# Patient Record
Sex: MALE | Race: WHITE | NOT HISPANIC OR LATINO | Employment: FULL TIME | URBAN - METROPOLITAN AREA
[De-identification: names, ages, dates, MRNs, and addresses within clinical notes are randomized per-mention and may not be internally consistent; named-entity substitution may affect disease eponyms.]

---

## 2018-04-02 ENCOUNTER — OFFICE VISIT (OUTPATIENT)
Dept: URGENT CARE | Facility: PHYSICIAN GROUP | Age: 33
End: 2018-04-02
Payer: COMMERCIAL

## 2018-04-02 ENCOUNTER — HOSPITAL ENCOUNTER (OUTPATIENT)
Dept: RADIOLOGY | Facility: MEDICAL CENTER | Age: 33
End: 2018-04-02
Attending: INTERNAL MEDICINE
Payer: COMMERCIAL

## 2018-04-02 VITALS
DIASTOLIC BLOOD PRESSURE: 76 MMHG | RESPIRATION RATE: 16 BRPM | HEART RATE: 97 BPM | BODY MASS INDEX: 34.33 KG/M2 | WEIGHT: 259 LBS | HEIGHT: 73 IN | TEMPERATURE: 98 F | SYSTOLIC BLOOD PRESSURE: 128 MMHG | OXYGEN SATURATION: 94 %

## 2018-04-02 DIAGNOSIS — N23 RENAL COLIC ON LEFT SIDE: ICD-10-CM

## 2018-04-02 DIAGNOSIS — N20.0 KIDNEY STONE: ICD-10-CM

## 2018-04-02 LAB
APPEARANCE UR: NORMAL
BILIRUB UR STRIP-MCNC: NEGATIVE MG/DL
COLOR UR AUTO: NORMAL
GLUCOSE UR STRIP.AUTO-MCNC: NEGATIVE MG/DL
KETONES UR STRIP.AUTO-MCNC: NEGATIVE MG/DL
LEUKOCYTE ESTERASE UR QL STRIP.AUTO: NEGATIVE
NITRITE UR QL STRIP.AUTO: NEGATIVE
PH UR STRIP.AUTO: 5 [PH] (ref 5–8)
PROT UR QL STRIP: NORMAL MG/DL
RBC UR QL AUTO: NORMAL
SP GR UR STRIP.AUTO: 1.02
UROBILINOGEN UR STRIP-MCNC: NEGATIVE MG/DL

## 2018-04-02 PROCEDURE — 99204 OFFICE O/P NEW MOD 45 MIN: CPT | Performed by: INTERNAL MEDICINE

## 2018-04-02 PROCEDURE — 81002 URINALYSIS NONAUTO W/O SCOPE: CPT | Performed by: INTERNAL MEDICINE

## 2018-04-02 PROCEDURE — 74176 CT ABD & PELVIS W/O CONTRAST: CPT

## 2018-04-02 RX ORDER — TAMSULOSIN HYDROCHLORIDE 0.4 MG/1
0.4 CAPSULE ORAL DAILY
Qty: 10 CAP | Refills: 0 | Status: SHIPPED | OUTPATIENT
Start: 2018-04-02 | End: 2018-08-18

## 2018-04-02 RX ORDER — NAPROXEN 500 MG/1
500 TABLET ORAL 2 TIMES DAILY WITH MEALS
Qty: 60 TAB | Refills: 0 | Status: SHIPPED | OUTPATIENT
Start: 2018-04-02 | End: 2018-08-18

## 2018-04-02 ASSESSMENT — ENCOUNTER SYMPTOMS
FEVER: 0
DIZZINESS: 0
PSYCHIATRIC NEGATIVE: 1
NAUSEA: 0
VOMITING: 0
SHORTNESS OF BREATH: 0
WEIGHT LOSS: 0
CHILLS: 0
EYES NEGATIVE: 1
FLANK PAIN: 1
COUGH: 0
PALPITATIONS: 0
SORE THROAT: 0
HEADACHES: 0
DIARRHEA: 0
BLOOD IN STOOL: 0
MYALGIAS: 0
CONSTITUTIONAL NEGATIVE: 1

## 2018-04-02 ASSESSMENT — PAIN SCALES - GENERAL: PAINLEVEL: 5=MODERATE PAIN

## 2018-04-03 NOTE — PROGRESS NOTES
Erik Sandoval is a 32 y.o. male who presents for Groin Pain (Blood in urine, headaches, lower back pain, abdominal pain x2weeks )       Patient is a 32-year-old male presents today with left-sided flank pain. Patient has had kidney stones in the past. Patient states that his pain is been intermittent over the last week and a half. Patient is now stated that the pain is become more consistent over the last several days. Patient denies any fever or shaking chills. Patient does note that he had blood in his urine. Patient denies any nausea vomiting or diarrhea.      PMH:  has a past medical history of Kidney stone.  MEDS:   Current Outpatient Prescriptions:   •  tamsulosin (FLOMAX) 0.4 MG capsule, Take 1 Cap by mouth every day., Disp: 10 Cap, Rfl: 0  •  naproxen (NAPROSYN) 500 MG Tab, Take 1 Tab by mouth 2 times a day, with meals., Disp: 60 Tab, Rfl: 0  •  azithromycin (ZITHROMAX) 250 MG Tab, 2 TABS ON DAY 1, 1 TAB ON DAYS 2-5., Disp: 6 Tab, Rfl: 0  ALLERGIES: No Known Allergies  SURGHX: History reviewed. No pertinent surgical history.  SOCHX:  reports that he has never smoked. He has never used smokeless tobacco. He reports that he does not drink alcohol or use drugs.  FH: family history is not on file.    Review of Systems   Constitutional: Negative.  Negative for chills, fever and weight loss.   HENT: Negative for sore throat.    Eyes: Negative.    Respiratory: Negative for cough and shortness of breath.    Cardiovascular: Negative for chest pain, palpitations and leg swelling.   Gastrointestinal: Negative for blood in stool, diarrhea, nausea and vomiting.   Genitourinary: Positive for flank pain and hematuria. Negative for dysuria, frequency and urgency.   Musculoskeletal: Negative for myalgias.   Skin: Negative for rash.   Neurological: Negative for dizziness (negative headache) and headaches.   Psychiatric/Behavioral: Negative.    All other systems reviewed and are negative.    No Known Allergies   Objective:  "  /76   Pulse 97   Temp 36.7 °C (98 °F)   Resp 16   Ht 1.854 m (6' 1\")   Wt 117.5 kg (259 lb)   SpO2 94%   BMI 34.17 kg/m²   Physical Exam   Constitutional: He is oriented to person, place, and time. He appears well-developed and well-nourished. He is active. No distress.   HENT:   Head: Normocephalic and atraumatic.   Right Ear: External ear normal.   Left Ear: External ear normal.   Mouth/Throat: Oropharynx is clear and moist. No oropharyngeal exudate.   Eyes: Conjunctivae and EOM are normal. Pupils are equal, round, and reactive to light. Right eye exhibits no discharge. Left eye exhibits no discharge. No scleral icterus.   Neck: Normal range of motion. Neck supple. No JVD present. Carotid bruit is not present. No thyroid mass and no thyromegaly present.   Cardiovascular: Normal rate, regular rhythm, S1 normal, S2 normal and normal heart sounds.  Exam reveals no friction rub.    No murmur heard.  Pulmonary/Chest: Effort normal and breath sounds normal. No respiratory distress. He has no wheezes. He has no rales.   Abdominal: Soft. Bowel sounds are normal. He exhibits no distension and no mass. There is no hepatosplenomegaly. There is tenderness in the left lower quadrant. There is no rebound and no guarding.       Musculoskeletal: Normal range of motion. He exhibits no edema.        Cervical back: Normal.   Lymphadenopathy:        Head (right side): No submental, no submandibular and no occipital adenopathy present.        Head (left side): No submental, no submandibular and no occipital adenopathy present.     He has no cervical adenopathy.   Neurological: He is alert and oriented to person, place, and time. He has normal strength. No cranial nerve deficit.   Skin: Skin is warm and dry. No rash noted. No erythema.   Psychiatric: He has a normal mood and affect. His behavior is normal. Thought content normal.   CT scan shows 6 mm stone in the distal left ureter, mild hydro-nephrosis    "   Assessment/Plan:   Assessment    1. Renal colic on left side  - CT-RENAL COLIC EVALUATION(A/P W/O); Future    2. Kidney stone  - tamsulosin (FLOMAX) 0.4 MG capsule; Take 1 Cap by mouth every day.  Dispense: 10 Cap; Refill: 0  - naproxen (NAPROSYN) 500 MG Tab; Take 1 Tab by mouth 2 times a day, with meals.  Dispense: 60 Tab; Refill: 0  Differential diagnosis, natural history, supportive care, and indications for immediate follow-up discussed.    Discussed with the patient to drink a significant amount of fluids. Patient reports to the emergency department for worsening pain/fever etc. patient follow up with PCP

## 2018-08-18 ENCOUNTER — APPOINTMENT (OUTPATIENT)
Dept: RADIOLOGY | Facility: MEDICAL CENTER | Age: 33
DRG: 872 | End: 2018-08-18
Attending: EMERGENCY MEDICINE
Payer: COMMERCIAL

## 2018-08-18 ENCOUNTER — APPOINTMENT (OUTPATIENT)
Dept: RADIOLOGY | Facility: MEDICAL CENTER | Age: 33
DRG: 872 | End: 2018-08-18
Payer: COMMERCIAL

## 2018-08-18 ENCOUNTER — HOSPITAL ENCOUNTER (INPATIENT)
Facility: MEDICAL CENTER | Age: 33
LOS: 2 days | DRG: 872 | End: 2018-08-20
Attending: EMERGENCY MEDICINE | Admitting: HOSPITALIST
Payer: COMMERCIAL

## 2018-08-18 DIAGNOSIS — N20.0 KIDNEY STONE: ICD-10-CM

## 2018-08-18 PROBLEM — E87.6 HYPOKALEMIA: Status: ACTIVE | Noted: 2018-08-18

## 2018-08-18 PROBLEM — E66.9 OBESITY: Status: ACTIVE | Noted: 2018-08-18

## 2018-08-18 PROBLEM — R07.9 CHEST PAIN: Status: ACTIVE | Noted: 2018-08-18

## 2018-08-18 PROBLEM — N39.0 SEPSIS DUE TO URINARY TRACT INFECTION (HCC): Status: ACTIVE | Noted: 2018-08-18

## 2018-08-18 PROBLEM — A41.9 SEPSIS DUE TO URINARY TRACT INFECTION (HCC): Status: ACTIVE | Noted: 2018-08-18

## 2018-08-18 LAB
ALBUMIN SERPL BCP-MCNC: 4.1 G/DL (ref 3.2–4.9)
ALBUMIN/GLOB SERPL: 1.2 G/DL
ALP SERPL-CCNC: 41 U/L (ref 30–99)
ALT SERPL-CCNC: 34 U/L (ref 2–50)
ANION GAP SERPL CALC-SCNC: 11 MMOL/L (ref 0–11.9)
APPEARANCE UR: CLEAR
AST SERPL-CCNC: 22 U/L (ref 12–45)
BACTERIA #/AREA URNS HPF: NEGATIVE /HPF
BASOPHILS # BLD AUTO: 0.2 % (ref 0–1.8)
BASOPHILS # BLD: 0.02 K/UL (ref 0–0.12)
BILIRUB SERPL-MCNC: 1.2 MG/DL (ref 0.1–1.5)
BILIRUB UR QL STRIP.AUTO: ABNORMAL
BUN SERPL-MCNC: 13 MG/DL (ref 8–22)
CALCIUM SERPL-MCNC: 8.7 MG/DL (ref 8.5–10.5)
CHLORIDE SERPL-SCNC: 103 MMOL/L (ref 96–112)
CO2 SERPL-SCNC: 21 MMOL/L (ref 20–33)
COLOR UR: ABNORMAL
CREAT SERPL-MCNC: 0.94 MG/DL (ref 0.5–1.4)
EKG IMPRESSION: NORMAL
EOSINOPHIL # BLD AUTO: 0 K/UL (ref 0–0.51)
EOSINOPHIL NFR BLD: 0 % (ref 0–6.9)
EPI CELLS #/AREA URNS HPF: ABNORMAL /HPF
ERYTHROCYTE [DISTWIDTH] IN BLOOD BY AUTOMATED COUNT: 40.5 FL (ref 35.9–50)
GLOBULIN SER CALC-MCNC: 3.3 G/DL (ref 1.9–3.5)
GLUCOSE SERPL-MCNC: 115 MG/DL (ref 65–99)
GLUCOSE UR STRIP.AUTO-MCNC: NEGATIVE MG/DL
HCT VFR BLD AUTO: 45.6 % (ref 42–52)
HGB BLD-MCNC: 15.4 G/DL (ref 14–18)
HYALINE CASTS #/AREA URNS LPF: ABNORMAL /LPF
IMM GRANULOCYTES # BLD AUTO: 0.05 K/UL (ref 0–0.11)
IMM GRANULOCYTES NFR BLD AUTO: 0.4 % (ref 0–0.9)
KETONES UR STRIP.AUTO-MCNC: NEGATIVE MG/DL
LACTATE BLD-SCNC: 1.1 MMOL/L (ref 0.5–2)
LACTATE BLD-SCNC: 1.3 MMOL/L (ref 0.5–2)
LEUKOCYTE ESTERASE UR QL STRIP.AUTO: ABNORMAL
LYMPHOCYTES # BLD AUTO: 1.14 K/UL (ref 1–4.8)
LYMPHOCYTES NFR BLD: 10.1 % (ref 22–41)
MCH RBC QN AUTO: 28.7 PG (ref 27–33)
MCHC RBC AUTO-ENTMCNC: 33.8 G/DL (ref 33.7–35.3)
MCV RBC AUTO: 85.1 FL (ref 81.4–97.8)
MICRO URNS: ABNORMAL
MONOCYTES # BLD AUTO: 0.6 K/UL (ref 0–0.85)
MONOCYTES NFR BLD AUTO: 5.3 % (ref 0–13.4)
NEUTROPHILS # BLD AUTO: 9.45 K/UL (ref 1.82–7.42)
NEUTROPHILS NFR BLD: 84 % (ref 44–72)
NITRITE UR QL STRIP.AUTO: NEGATIVE
NRBC # BLD AUTO: 0 K/UL
NRBC BLD-RTO: 0 /100 WBC
PH UR STRIP.AUTO: 6 [PH]
PLATELET # BLD AUTO: 176 K/UL (ref 164–446)
PMV BLD AUTO: 9.3 FL (ref 9–12.9)
POTASSIUM SERPL-SCNC: 3.1 MMOL/L (ref 3.6–5.5)
PROT SERPL-MCNC: 7.4 G/DL (ref 6–8.2)
PROT UR QL STRIP: 30 MG/DL
RBC # BLD AUTO: 5.36 M/UL (ref 4.7–6.1)
RBC # URNS HPF: ABNORMAL /HPF
RBC UR QL AUTO: ABNORMAL
SODIUM SERPL-SCNC: 135 MMOL/L (ref 135–145)
SP GR UR STRIP.AUTO: 1.03
TSH SERPL DL<=0.005 MIU/L-ACNC: 1.27 UIU/ML (ref 0.38–5.33)
UROBILINOGEN UR STRIP.AUTO-MCNC: 2 MG/DL
WBC # BLD AUTO: 11.3 K/UL (ref 4.8–10.8)
WBC #/AREA URNS HPF: ABNORMAL /HPF

## 2018-08-18 PROCEDURE — 87086 URINE CULTURE/COLONY COUNT: CPT

## 2018-08-18 PROCEDURE — 81001 URINALYSIS AUTO W/SCOPE: CPT

## 2018-08-18 PROCEDURE — 93005 ELECTROCARDIOGRAM TRACING: CPT | Performed by: EMERGENCY MEDICINE

## 2018-08-18 PROCEDURE — 93005 ELECTROCARDIOGRAM TRACING: CPT

## 2018-08-18 PROCEDURE — 96365 THER/PROPH/DIAG IV INF INIT: CPT

## 2018-08-18 PROCEDURE — 85025 COMPLETE CBC W/AUTO DIFF WBC: CPT

## 2018-08-18 PROCEDURE — 83605 ASSAY OF LACTIC ACID: CPT

## 2018-08-18 PROCEDURE — 700102 HCHG RX REV CODE 250 W/ 637 OVERRIDE(OP): Performed by: HOSPITALIST

## 2018-08-18 PROCEDURE — 87040 BLOOD CULTURE FOR BACTERIA: CPT | Mod: 91

## 2018-08-18 PROCEDURE — 700102 HCHG RX REV CODE 250 W/ 637 OVERRIDE(OP): Performed by: EMERGENCY MEDICINE

## 2018-08-18 PROCEDURE — 700105 HCHG RX REV CODE 258: Performed by: EMERGENCY MEDICINE

## 2018-08-18 PROCEDURE — 80053 COMPREHEN METABOLIC PANEL: CPT

## 2018-08-18 PROCEDURE — 74176 CT ABD & PELVIS W/O CONTRAST: CPT

## 2018-08-18 PROCEDURE — 83036 HEMOGLOBIN GLYCOSYLATED A1C: CPT

## 2018-08-18 PROCEDURE — 96361 HYDRATE IV INFUSION ADD-ON: CPT

## 2018-08-18 PROCEDURE — A9270 NON-COVERED ITEM OR SERVICE: HCPCS | Performed by: HOSPITALIST

## 2018-08-18 PROCEDURE — 700111 HCHG RX REV CODE 636 W/ 250 OVERRIDE (IP): Performed by: EMERGENCY MEDICINE

## 2018-08-18 PROCEDURE — 770006 HCHG ROOM/CARE - MED/SURG/GYN SEMI*

## 2018-08-18 PROCEDURE — 71045 X-RAY EXAM CHEST 1 VIEW: CPT

## 2018-08-18 PROCEDURE — 96375 TX/PRO/DX INJ NEW DRUG ADDON: CPT

## 2018-08-18 PROCEDURE — 84443 ASSAY THYROID STIM HORMONE: CPT

## 2018-08-18 PROCEDURE — 36415 COLL VENOUS BLD VENIPUNCTURE: CPT

## 2018-08-18 PROCEDURE — 700111 HCHG RX REV CODE 636 W/ 250 OVERRIDE (IP)

## 2018-08-18 PROCEDURE — 99223 1ST HOSP IP/OBS HIGH 75: CPT | Performed by: HOSPITALIST

## 2018-08-18 PROCEDURE — A9270 NON-COVERED ITEM OR SERVICE: HCPCS | Performed by: EMERGENCY MEDICINE

## 2018-08-18 PROCEDURE — 99291 CRITICAL CARE FIRST HOUR: CPT

## 2018-08-18 PROCEDURE — 304561 HCHG STAT O2

## 2018-08-18 DEVICE — STENT UROLOGICAL POLARIS 6X26  ULTRA: Type: IMPLANTABLE DEVICE | Site: URETER | Status: FUNCTIONAL

## 2018-08-18 RX ORDER — PROMETHAZINE HYDROCHLORIDE 25 MG/1
12.5-25 SUPPOSITORY RECTAL EVERY 4 HOURS PRN
Status: DISCONTINUED | OUTPATIENT
Start: 2018-08-18 | End: 2018-08-20 | Stop reason: HOSPADM

## 2018-08-18 RX ORDER — SODIUM CHLORIDE 9 MG/ML
INJECTION, SOLUTION INTRAVENOUS CONTINUOUS
Status: DISCONTINUED | OUTPATIENT
Start: 2018-08-18 | End: 2018-08-20 | Stop reason: HOSPADM

## 2018-08-18 RX ORDER — ONDANSETRON 4 MG/1
4 TABLET, ORALLY DISINTEGRATING ORAL EVERY 4 HOURS PRN
Status: DISCONTINUED | OUTPATIENT
Start: 2018-08-18 | End: 2018-08-20 | Stop reason: HOSPADM

## 2018-08-18 RX ORDER — ONDANSETRON 2 MG/ML
4 INJECTION INTRAMUSCULAR; INTRAVENOUS EVERY 4 HOURS PRN
Status: DISCONTINUED | OUTPATIENT
Start: 2018-08-18 | End: 2018-08-20 | Stop reason: HOSPADM

## 2018-08-18 RX ORDER — AMOXICILLIN 250 MG
2 CAPSULE ORAL 2 TIMES DAILY
Status: DISCONTINUED | OUTPATIENT
Start: 2018-08-18 | End: 2018-08-20 | Stop reason: HOSPADM

## 2018-08-18 RX ORDER — KETOROLAC TROMETHAMINE 30 MG/ML
30 INJECTION, SOLUTION INTRAMUSCULAR; INTRAVENOUS ONCE
Status: COMPLETED | OUTPATIENT
Start: 2018-08-18 | End: 2018-08-18

## 2018-08-18 RX ORDER — MORPHINE SULFATE 4 MG/ML
4 INJECTION, SOLUTION INTRAMUSCULAR; INTRAVENOUS ONCE
Status: COMPLETED | OUTPATIENT
Start: 2018-08-18 | End: 2018-08-18

## 2018-08-18 RX ORDER — POLYETHYLENE GLYCOL 3350 17 G/17G
1 POWDER, FOR SOLUTION ORAL
Status: DISCONTINUED | OUTPATIENT
Start: 2018-08-18 | End: 2018-08-20 | Stop reason: HOSPADM

## 2018-08-18 RX ORDER — POTASSIUM CHLORIDE 20 MEQ/1
40 TABLET, EXTENDED RELEASE ORAL ONCE
Status: ACTIVE | OUTPATIENT
Start: 2018-08-18 | End: 2018-08-19

## 2018-08-18 RX ORDER — OXYCODONE HYDROCHLORIDE 10 MG/1
10 TABLET ORAL
Status: DISCONTINUED | OUTPATIENT
Start: 2018-08-18 | End: 2018-08-20 | Stop reason: HOSPADM

## 2018-08-18 RX ORDER — PROMETHAZINE HYDROCHLORIDE 25 MG/1
12.5-25 TABLET ORAL EVERY 4 HOURS PRN
Status: DISCONTINUED | OUTPATIENT
Start: 2018-08-18 | End: 2018-08-20 | Stop reason: HOSPADM

## 2018-08-18 RX ORDER — ONDANSETRON 2 MG/ML
4 INJECTION INTRAMUSCULAR; INTRAVENOUS ONCE
Status: COMPLETED | OUTPATIENT
Start: 2018-08-18 | End: 2018-08-18

## 2018-08-18 RX ORDER — SODIUM CHLORIDE 9 MG/ML
30 INJECTION, SOLUTION INTRAVENOUS ONCE
Status: COMPLETED | OUTPATIENT
Start: 2018-08-18 | End: 2018-08-18

## 2018-08-18 RX ORDER — OXYCODONE HYDROCHLORIDE 5 MG/1
5 TABLET ORAL
Status: DISCONTINUED | OUTPATIENT
Start: 2018-08-18 | End: 2018-08-20 | Stop reason: HOSPADM

## 2018-08-18 RX ORDER — HEPARIN SODIUM 5000 [USP'U]/ML
5000 INJECTION, SOLUTION INTRAVENOUS; SUBCUTANEOUS EVERY 8 HOURS
Status: DISCONTINUED | OUTPATIENT
Start: 2018-08-18 | End: 2018-08-20 | Stop reason: HOSPADM

## 2018-08-18 RX ORDER — SODIUM CHLORIDE 9 MG/ML
500 INJECTION, SOLUTION INTRAVENOUS
Status: COMPLETED | OUTPATIENT
Start: 2018-08-18 | End: 2018-08-20

## 2018-08-18 RX ORDER — POTASSIUM CHLORIDE 20 MEQ/1
40 TABLET, EXTENDED RELEASE ORAL ONCE
Status: COMPLETED | OUTPATIENT
Start: 2018-08-18 | End: 2018-08-18

## 2018-08-18 RX ORDER — BISACODYL 10 MG
10 SUPPOSITORY, RECTAL RECTAL
Status: DISCONTINUED | OUTPATIENT
Start: 2018-08-18 | End: 2018-08-20 | Stop reason: HOSPADM

## 2018-08-18 RX ORDER — SODIUM CHLORIDE 9 MG/ML
30 INJECTION, SOLUTION INTRAVENOUS
Status: DISCONTINUED | OUTPATIENT
Start: 2018-08-18 | End: 2018-08-20 | Stop reason: HOSPADM

## 2018-08-18 RX ADMIN — MORPHINE SULFATE 4 MG: 4 INJECTION INTRAVENOUS at 21:48

## 2018-08-18 RX ADMIN — POTASSIUM CHLORIDE 40 MEQ: 1500 TABLET, EXTENDED RELEASE ORAL at 21:53

## 2018-08-18 RX ADMIN — SODIUM CHLORIDE 3507 ML: 9 INJECTION, SOLUTION INTRAVENOUS at 21:56

## 2018-08-18 RX ADMIN — ONDANSETRON 4 MG: 2 INJECTION, SOLUTION INTRAMUSCULAR; INTRAVENOUS at 21:48

## 2018-08-18 RX ADMIN — PIPERACILLIN AND TAZOBACTAM 4.5 G: 4; .5 INJECTION, POWDER, LYOPHILIZED, FOR SOLUTION INTRAVENOUS; PARENTERAL at 21:30

## 2018-08-18 RX ADMIN — KETOROLAC TROMETHAMINE 30 MG: 30 INJECTION, SOLUTION INTRAMUSCULAR at 21:53

## 2018-08-18 ASSESSMENT — ENCOUNTER SYMPTOMS
SHORTNESS OF BREATH: 1
DEPRESSION: 0
NAUSEA: 1
SPEECH CHANGE: 0
SENSORY CHANGE: 0
HALLUCINATIONS: 0
FLANK PAIN: 1
ABDOMINAL PAIN: 1
COUGH: 0
FEVER: 0
FOCAL WEAKNESS: 0
PALPITATIONS: 0
MYALGIAS: 0
EYE DISCHARGE: 0
VOMITING: 1
BLURRED VISION: 0
BRUISES/BLEEDS EASILY: 0
DOUBLE VISION: 0
HEARTBURN: 0
DIZZINESS: 0
HEMOPTYSIS: 0
WEAKNESS: 0
CHILLS: 0

## 2018-08-18 ASSESSMENT — PAIN SCALES - GENERAL
PAINLEVEL_OUTOF10: 7
PAINLEVEL_OUTOF10: 2

## 2018-08-18 ASSESSMENT — LIFESTYLE VARIABLES
SUBSTANCE_ABUSE: 0
DO YOU DRINK ALCOHOL: NO

## 2018-08-19 ENCOUNTER — APPOINTMENT (OUTPATIENT)
Dept: RADIOLOGY | Facility: MEDICAL CENTER | Age: 33
DRG: 872 | End: 2018-08-19
Attending: UROLOGY
Payer: COMMERCIAL

## 2018-08-19 LAB
EST. AVERAGE GLUCOSE BLD GHB EST-MCNC: 126 MG/DL
HBA1C MFR BLD: 6 % (ref 0–5.6)
LACTATE BLD-SCNC: 1 MMOL/L (ref 0.5–2)
LACTATE BLD-SCNC: 1.1 MMOL/L (ref 0.5–2)
TROPONIN I SERPL-MCNC: 0.01 NG/ML (ref 0–0.04)
TROPONIN I SERPL-MCNC: <0.01 NG/ML (ref 0–0.04)

## 2018-08-19 PROCEDURE — 500879 HCHG PACK, CYSTO: Performed by: UROLOGY

## 2018-08-19 PROCEDURE — 700111 HCHG RX REV CODE 636 W/ 250 OVERRIDE (IP): Performed by: HOSPITALIST

## 2018-08-19 PROCEDURE — 99232 SBSQ HOSP IP/OBS MODERATE 35: CPT | Performed by: INTERNAL MEDICINE

## 2018-08-19 PROCEDURE — 160039 HCHG SURGERY MINUTES - EA ADDL 1 MIN LEVEL 3: Performed by: UROLOGY

## 2018-08-19 PROCEDURE — 160028 HCHG SURGERY MINUTES - 1ST 30 MINS LEVEL 3: Performed by: UROLOGY

## 2018-08-19 PROCEDURE — C2617 STENT, NON-COR, TEM W/O DEL: HCPCS | Performed by: UROLOGY

## 2018-08-19 PROCEDURE — 700105 HCHG RX REV CODE 258: Performed by: HOSPITALIST

## 2018-08-19 PROCEDURE — 160009 HCHG ANES TIME/MIN: Performed by: UROLOGY

## 2018-08-19 PROCEDURE — 160048 HCHG OR STATISTICAL LEVEL 1-5: Performed by: UROLOGY

## 2018-08-19 PROCEDURE — 501329 HCHG SET, CYSTO IRRIG Y TUR: Performed by: UROLOGY

## 2018-08-19 PROCEDURE — 160002 HCHG RECOVERY MINUTES (STAT): Performed by: UROLOGY

## 2018-08-19 PROCEDURE — 84484 ASSAY OF TROPONIN QUANT: CPT | Mod: 91

## 2018-08-19 PROCEDURE — 83605 ASSAY OF LACTIC ACID: CPT

## 2018-08-19 PROCEDURE — 0T778DZ DILATION OF LEFT URETER WITH INTRALUMINAL DEVICE, VIA NATURAL OR ARTIFICIAL OPENING ENDOSCOPIC: ICD-10-PCS | Performed by: UROLOGY

## 2018-08-19 PROCEDURE — 160035 HCHG PACU - 1ST 60 MINS PHASE I: Performed by: UROLOGY

## 2018-08-19 PROCEDURE — A9270 NON-COVERED ITEM OR SERVICE: HCPCS | Performed by: HOSPITALIST

## 2018-08-19 PROCEDURE — 700102 HCHG RX REV CODE 250 W/ 637 OVERRIDE(OP): Performed by: HOSPITALIST

## 2018-08-19 PROCEDURE — 36415 COLL VENOUS BLD VENIPUNCTURE: CPT

## 2018-08-19 PROCEDURE — 770006 HCHG ROOM/CARE - MED/SURG/GYN SEMI*

## 2018-08-19 RX ADMIN — POLYETHYLENE GLYCOL 3350 1 PACKET: 17 POWDER, FOR SOLUTION ORAL at 05:46

## 2018-08-19 RX ADMIN — OXYCODONE HYDROCHLORIDE 10 MG: 10 TABLET ORAL at 21:41

## 2018-08-19 RX ADMIN — OXYCODONE HYDROCHLORIDE 5 MG: 5 TABLET ORAL at 05:46

## 2018-08-19 RX ADMIN — SODIUM CHLORIDE: 9 INJECTION, SOLUTION INTRAVENOUS at 02:40

## 2018-08-19 RX ADMIN — DOCUSATE SODIUM -SENNOSIDES 2 TABLET: 50; 8.6 TABLET, COATED ORAL at 18:26

## 2018-08-19 RX ADMIN — OXYCODONE HYDROCHLORIDE 5 MG: 5 TABLET ORAL at 15:24

## 2018-08-19 RX ADMIN — OXYCODONE HYDROCHLORIDE 10 MG: 10 TABLET ORAL at 18:26

## 2018-08-19 RX ADMIN — HEPARIN SODIUM 5000 UNITS: 5000 INJECTION, SOLUTION INTRAVENOUS; SUBCUTANEOUS at 21:41

## 2018-08-19 RX ADMIN — OXYCODONE HYDROCHLORIDE 5 MG: 5 TABLET ORAL at 02:25

## 2018-08-19 RX ADMIN — PIPERACILLIN AND TAZOBACTAM 4.5 G: 4; .5 INJECTION, POWDER, LYOPHILIZED, FOR SOLUTION INTRAVENOUS; PARENTERAL at 21:41

## 2018-08-19 RX ADMIN — OXYCODONE HYDROCHLORIDE 5 MG: 5 TABLET ORAL at 07:32

## 2018-08-19 RX ADMIN — PIPERACILLIN AND TAZOBACTAM 4.5 G: 4; .5 INJECTION, POWDER, LYOPHILIZED, FOR SOLUTION INTRAVENOUS; PARENTERAL at 13:38

## 2018-08-19 RX ADMIN — DOCUSATE SODIUM -SENNOSIDES 2 TABLET: 50; 8.6 TABLET, COATED ORAL at 05:46

## 2018-08-19 RX ADMIN — HEPARIN SODIUM 5000 UNITS: 5000 INJECTION, SOLUTION INTRAVENOUS; SUBCUTANEOUS at 09:30

## 2018-08-19 RX ADMIN — PIPERACILLIN AND TAZOBACTAM 4.5 G: 4; .5 INJECTION, POWDER, LYOPHILIZED, FOR SOLUTION INTRAVENOUS; PARENTERAL at 06:53

## 2018-08-19 RX ADMIN — SODIUM CHLORIDE: 9 INJECTION, SOLUTION INTRAVENOUS at 21:41

## 2018-08-19 RX ADMIN — HEPARIN SODIUM 5000 UNITS: 5000 INJECTION, SOLUTION INTRAVENOUS; SUBCUTANEOUS at 13:38

## 2018-08-19 RX ADMIN — OXYCODONE HYDROCHLORIDE 10 MG: 10 TABLET ORAL at 12:01

## 2018-08-19 RX ADMIN — PIPERACILLIN AND TAZOBACTAM 4.5 G: 4; .5 INJECTION, POWDER, LYOPHILIZED, FOR SOLUTION INTRAVENOUS; PARENTERAL at 02:45

## 2018-08-19 ASSESSMENT — PATIENT HEALTH QUESTIONNAIRE - PHQ9
2. FEELING DOWN, DEPRESSED, IRRITABLE, OR HOPELESS: NOT AT ALL
1. LITTLE INTEREST OR PLEASURE IN DOING THINGS: NOT AT ALL
SUM OF ALL RESPONSES TO PHQ9 QUESTIONS 1 AND 2: 0

## 2018-08-19 ASSESSMENT — PAIN SCALES - GENERAL
PAINLEVEL_OUTOF10: ASSUMED PAIN PRESENT
PAINLEVEL_OUTOF10: 7
PAINLEVEL_OUTOF10: 6
PAINLEVEL_OUTOF10: ASSUMED PAIN PRESENT
PAINLEVEL_OUTOF10: 3
PAINLEVEL_OUTOF10: 6
PAINLEVEL_OUTOF10: 2
PAINLEVEL_OUTOF10: 5
PAINLEVEL_OUTOF10: ASSUMED PAIN PRESENT
PAINLEVEL_OUTOF10: 6
PAINLEVEL_OUTOF10: ASSUMED PAIN PRESENT

## 2018-08-19 ASSESSMENT — COGNITIVE AND FUNCTIONAL STATUS - GENERAL
DAILY ACTIVITIY SCORE: 24
MOBILITY SCORE: 24
SUGGESTED CMS G CODE MODIFIER MOBILITY: CH
SUGGESTED CMS G CODE MODIFIER DAILY ACTIVITY: CH

## 2018-08-19 ASSESSMENT — ENCOUNTER SYMPTOMS
DIZZINESS: 0
FEVER: 0
DIARRHEA: 0
SHORTNESS OF BREATH: 0
FLANK PAIN: 0
DEPRESSION: 0
BACK PAIN: 0
COUGH: 0
CHILLS: 0
STRIDOR: 0
INSOMNIA: 0
CONSTIPATION: 0
BLOOD IN STOOL: 0
ABDOMINAL PAIN: 1

## 2018-08-19 ASSESSMENT — LIFESTYLE VARIABLES: EVER_SMOKED: NEVER

## 2018-08-19 NOTE — ASSESSMENT & PLAN NOTE
This is sepsis (without associated acute organ dysfunction).   Urinary tract infection with obstructive uropathy  Continue with IV antibiotics  Follow cultures, NGTD  De-escalate therapy as clinically appropriate

## 2018-08-19 NOTE — ASSESSMENT & PLAN NOTE
Resolved, Likely related to acute infection with associated shortness of breath   Cont to monitor negative ekg; check trop  No cardiac hx

## 2018-08-19 NOTE — ASSESSMENT & PLAN NOTE
Obstructing stone in the left distal ureter with upstream collecting system dilation.  Urology consulting, patient status post cystoscopy and stent placement.  Patient is to follow-up in a few weeks with urology as an outpatient for stent removal.  Continue pain control, IV fluids

## 2018-08-19 NOTE — CARE PLAN
Problem: Safety  Goal: Will remain free from injury  Outcome: PROGRESSING AS EXPECTED  All safety precautions in place. No evidence of falls or harm.

## 2018-08-19 NOTE — PROGRESS NOTES
Patient alert and oriented. Wife at bedside. Patient complains of pain in abdomen-po medication given as needed-patient stated that the oxycodone is relieving his pain. Patient voiding tea colored urine. IV fluids maintained. Patient tolerating meals with no signs or symptoms of increased pain. Patient educated on blood clot prevention-verbalized understanding. Plan is for patient to be discharged home on 8/20-pending pain control. All belongings and call light within reach. Monitoring to continue.

## 2018-08-19 NOTE — PROGRESS NOTES
32 year old male with fever, chills, malaise and left flank pain for 24 hours. Has a CT showing a left distal ureteral stone. Discussed with patient and his wife the need for emergent treatment with a stent or nephrostomy tube. He would like to proceed with the cystoscopy with left stent placement and is aware of the risks and benefits. Informed consent has been given to me to proceed.

## 2018-08-19 NOTE — ED PROVIDER NOTES
"ED Provider Note    Scribed for Alfred Reis M.D. by Zabrina Allen. 8/18/2018, 9:07 PM.    Primary care provider: Pcp Pt States None  Means of arrival: walk in  History obtained from: patient and wife  History limited by: none    CHIEF COMPLAINT  Chief Complaint   Patient presents with   • Chest Pain     Since last night, \"I'm having chest pains, hot flashes, fevers, and just not feeling good, and it's gotten worse today.    • Fever     Reports was up to 104.5. Last took tylenol at 19:00, temp now 98.6 oral.    • Abdominal Pain     radiating to groin, nausea but denies vomiting.        HPI  Erik Sandoval is a 32 y.o. male with a history of kidney stones who presents to the Emergency Department for evaluation of chest pain onset last night. Patient confirms associated fever and groin pain. Additionally, patient felt hot and lightheaded and fell last night but did not hit his head. Patient confirms dysuria for the past few days. He endorses fever up to 104.5 for which he took Tylenol at approximately 7 PM. He denies any neck pain, neck stiffness, shortness of breath, back pain, penile discharge although he does report some mild urinary incontinence which is new for him. Patient reports that he took tylenol with mild alleviation of his symptoms. He has no history of STI or diabetes. He currently has a nasal canula in place but is not normally on supplemental oxygen at home.    REVIEW OF SYSTEMS  Pertinent positives include fever, lightheadedness, chest pain, groin pain, dysuria. Pertinent negatives include no neck pain, neck stiffness, shortness of breath, back pain, penile discharge. As above, all other systems reviewed and are negative.   See HPI for further details.  C     PAST MEDICAL HISTORY   has a past medical history of Kidney stone.    SURGICAL HISTORY  patient denies any surgical history    SOCIAL HISTORY  Social History   Substance Use Topics   • Smoking status: Never Smoker   • Smokeless tobacco: " "Never Used   • Alcohol use No      History   Drug Use No       FAMILY HISTORY  No family history on file.    CURRENT MEDICATIONS  Home Medications    **Home medications have not yet been reviewed for this encounter**         ALLERGIES  No Known Allergies    PHYSICAL EXAM  VITAL SIGNS: /82   Pulse (!) 117   Temp (!) 35.7 °C (96.2 °F)   Resp 16   Ht 1.854 m (6' 1\")   Wt 116.9 kg (257 lb 11.5 oz)   SpO2 92%   BMI 34.00 kg/m²   Vitals reviewed.  Constitutional: Alert in mild distress. Appears uncomfortable and ill. Diaphoretic.  HENT: No signs of trauma, Bilateral external ears normal, Nose normal. Dry mucous membranes.  Eyes: Pupils are equal and reactive, Conjunctiva normal, Non-icteric.   Neck: Normal range of motion, No tenderness, Supple, No stridor.   Lymphatic: No lymphadenopathy noted.   Cardiovascular: Regular rhythm and tachycardic, no murmurs.   Thorax & Lungs: Normal breath sounds, No respiratory distress, No wheezing, No chest tenderness. Tachypneic   Abdomen: Bowel sounds normal, Soft, Diffuse tenderness to palpation, No peritoneal signs, No masses, No pulsatile masses.   :  Normal male external genitalia, circumcised, tenderness over bilateral testicles without erythema or masses. No perineal erythema or crepitus.   Skin: Warm, Dry, No erythema, No rash.   Back: No bony tenderness   Extremities: Intact distal pulses, No edema, No tenderness, No cyanosis  Musculoskeletal: Good range of motion in all major joints. No tenderness to palpation or major deformities noted.   Neurologic: Alert , Normal motor function, Normal sensory function, No focal deficits noted.   Psychiatric: Affect normal, Judgment normal, Mood normal.     DIAGNOSTIC STUDIES / PROCEDURES    LABS  Labs Reviewed   CBC WITH DIFFERENTIAL - Abnormal; Notable for the following:        Result Value    WBC 11.3 (*)     Neutrophils-Polys 84.00 (*)     Lymphocytes 10.10 (*)     Neutrophils (Absolute) 9.45 (*)     All other components " "within normal limits   COMP METABOLIC PANEL - Abnormal; Notable for the following:     Potassium 3.1 (*)     Glucose 115 (*)     All other components within normal limits   URINALYSIS - Abnormal; Notable for the following:     Protein 30 (*)     Bilirubin Small (*)     Leukocyte Esterase Trace (*)     Occult Blood Small (*)     All other components within normal limits    Narrative:     Indication for culture:->Emergency Room Patient   URINE MICROSCOPIC (W/UA) - Abnormal; Notable for the following:     WBC 2-5 (*)     RBC 5-10 (*)     All other components within normal limits    Narrative:     Indication for culture:->Emergency Room Patient   LACTIC ACID   LACTIC ACID   URINE CULTURE(NEW)    Narrative:     Indication for culture:->Emergency Room Patient   BLOOD CULTURE    Narrative:     Per Hospital Policy: Only change Specimen Src: to \"Line\" if  specified by physician order.   BLOOD CULTURE    Narrative:     Per Hospital Policy: Only change Specimen Src: to \"Line\" if  specified by physician order.   ESTIMATED GFR      All labs reviewed by me.    EKG Interpretation:  Interpreted by me    12 Lead EKG interpreted by me to show:  Sinus tachycardia  Rate 113  Axis: Normal  Intervals: Normal  Baseline wander  Isolated T wave inversion in lead 3  Normal ST segments  My impression of this EKG: Does not indicate STEMI, ischemia, or arrhythmia at this time.    RADIOLOGY  DX-CHEST-PORTABLE (1 VIEW)   Final Result         1. No acute cardiopulmonary abnormalities are identified.        The radiologist's interpretation of all radiological studies have been reviewed by me.    COURSE & MEDICAL DECISION MAKING  Nursing notes, VS, PMSFHx reviewed in chart.  Differential diagnoses include but not limited to: Cystitis, STI, nephrolithiasis, pyelonephritis, Amarilis's gangrene, epididymitis, pneumonia, myocarditis, less likely PE.    9:07 PM Patient seen and examined at bedside. Patient arrives hypothermic, tachycardic, tachypneic, " and hypoxic with a normal blood pressure. Patient appears dehydrated and ill. The physical exam is remarkable for diffuse abdominal tenderness worse in the bilateral lower quadrants as well as testicular tenderness. There is no testicular erythema, edema, or perineal erythema or crepitus to suggest a Amarilis's gangrene or testicular infection. No peritoneal signs although the patient is tender over his abdomen. There is no rebound or guarding. No signs to suggest endocarditis. Neck is supple without meningeal signs. Very low suspicion for CNS infection. Lungs are clear and there are no cardiac murmurs. EKG does not suggest pericarditis. Certainly this could be a myocarditis but I have a lower suspicion for this. Given the patient's history of nephrolithiasis and his complaints of dysuria and genital pain, I feel that this is the most likely etiology for his symptoms. Ordered for lactic acid, CT-abdomen-pelvis, urine microscopic, estimated GFR, CBC, CMP, urinalysis, urine culture, blood culture, Dx-chest, EKG to evaluate. Patient will be treated with IV fluids secondary to sepsis protocol, 4.5 mg zosyn, 4 mg morphine, 4 mg zofran, 40 mEq Kdur, toradol for his symptoms.      Labs reveal a mildly elevated white blood cell count to 11.3. Potassium is slightly low at 3.1 and the patient will be given 40 mEq of Hortensia Dur for treatment. Blood glucose slightly elevated to 115. Lactic acid is normal ×2. Urinalysis reveals trace leukocyte esterase, 2-5 white blood cells with 5-10 red blood cells, no ketones or bacteria.    Chest x-ray does not reveal acute cardiopulmonary abnormalities. CT of the abdomen and pelvis reveals an obstructing 5 x 9 mm calculus in the distal left ureter with mild upstream collecting system dilatation. Patient will require either stenting or nephrostomy tube likely tonight secondary to an infected stone.    10:16 PM Paged urology.     10:25 PM Spoke to Dr. Dee (urology) who will take the patient to  the OR but requests that the patient is admitted by a hospitalist due to his hypoxia which is reasonable.    10:33 PM Spoke to Dr. Hu (hospitalist) who agrees to admit the patient.    10:45 PM Patient reevaluated at bedside. Patient is improved after administration of IV fluids. Informed that he will need to be admitted at this time. He understands and agrees to admission at this time.    DISPOSITION:  Patient will be admitted to Dr. Hu (hospitalist) in guarded condition.    FINAL IMPRESSION  1. Sepsis  2. UTI  3. Nephrolithiasis  4. Hypokalemia     Zabrina VALENTE (Scribe), am scribing for, and in the presence of, Alfred Reis M.D..    Electronically signed by: Zabrina Allen (Scribe), 8/18/2018    Alrfed VALENTE M.D. personally performed the services described in this documentation, as scribed by Zabrina Allen in my presence, and it is both accurate and complete.    The note accurately reflects work and decisions made by me.  Alfred Reis  8/19/2018  1:41 AM

## 2018-08-19 NOTE — OR SURGEON
Immediate Post OP Note    PreOp Diagnosis: Urosepsis                                 Left distal ureteral stone    PostOp Diagnosis: As above    Procedure(s):  CYSTOSCOPY   LEFT 6 [place] x 26cm STENT PLACEMENT - Wound Class: Clean Contaminated    Surgeon(s):  Cr Dee M.D.    Anesthesiologist/Type of Anesthesia:  Anesthesiologist: Rich Ceja M.D./General ETT    Surgical Staff:  Circulator: Isabelle Bobo R.N.  Scrub Person: Estefania Sutton    Specimens removed if any:  None    Estimated Blood Loss: N/A    Findings: High grade obstruction of the left distal ureter    Complications: None  Drains: 6 [place] x 26cm        8/19/2018 12:17 AM Cr Dee M.D.

## 2018-08-19 NOTE — PROGRESS NOTES
Pt arrived on unit about 0200 from PACU.   c/o pain 6/10, relieved with Oxy 5mg.    Lungs dim throughout, arrived on 3L O2  Cardio WDL  BS normoactive x4, LBM 8/17/18  Pt voided red urine and c/o dysuria.  Skin WDL  IVF- NS @150mL/hr  Call light within reach, will continue to monitor.

## 2018-08-19 NOTE — OP REPORT
DATE OF SERVICE:  08/18/2018    PREOPERATIVE DIAGNOSES:  Urosepsis, left distal ureterolithiasis.    OPERATION/PROCEDURE PERFORMED:  1.  Rigid cystourethroscopy.  2.  Left 6-[place] x 26 cm stent placement.    SURGEON:  Cr Dee MD    ANESTHESIA:  General endotracheal.    ANESTHESIOLOGIST:  Rich Ceja MD    POSTOPERATIVE DIAGNOSES:  Urosepsis, left distal ureterolithiasis.    COMPLICATIONS:  None.    DRAINS:  6-[place] x 26 cm stent.    INDICATIONS:  The patient is a 32-year-old male with a history of left distal   ureteral stone and urosepsis.  He has hypoxia, fever to 104.5 and an   obstructing stone requiring emergent drainage.  Prior to surgery, all risks   and benefits were discussed with the patient.  Informed consent was given to   me to proceed.    DESCRIPTION OF PROCEDURE:  After informed consent was obtained, the patient   was brought to the operating room and placed in supine.  General endotracheal   anesthetic was administered.  Patient had already received Zosyn in the   emergency room.  Sequential compression devices were in place and activated.    He was positioned in modified lithotomy.  The operative area was Betadine   prepped and draped in usual sterile fashion.  Surgical time-out was called.    All members of the operative team agree as the patient's name, procedure to be   performed with no objections, attention was directed to the procedure.  I   passed a generously lubricated 25-[place] rigid cystoscope per urethra with   30-degree lens.  The anterior urethra was normal.  Prostatic fossa measured 4   cm in length, slightly elevated median bar.  Upon entering the bladder, serial   evaluation shows no evidence of stones, tumors, or diverticula.  Right   ureteral orifice has clear efflux.  There is no reflux noted from the left   ureteral orifice.  I cannulated the orifice with a 0.35 straight tip sensor   wire to back and forth motion.  I was able to get the wire up into the kidney   as  documented fluoroscopy and then pushed up a 6-Japanese x 26 cm stent.  When I   withdrew the guidewire, there was excellent coil in kidney and high pressure   drainage from the islet of the stent in the bladder with what appeared to be   debris and infected urine.  The bladder was drained.  He tolerated the   procedure well, was awake in the operating room and transferred to recovery   room where he arrived in stable condition.       ____________________________________     MD ERROL DALE / ROBERT    DD:  08/19/2018 00:16:39  DT:  08/19/2018 00:56:38    D#:  [id number]  Job#:  [id number]

## 2018-08-19 NOTE — ED TRIAGE NOTES
"Chief Complaint   Patient presents with   • Chest Pain     Since last night, \"I'm having chest pains, hot flashes, fevers, and just not feeling good, and it's gotten worse today.    • Fever     Reports was up to 104.5. Last took tylenol at 19:00, temp now 98.6 oral.    • Abdominal Pain     radiating to groin, nausea but denies vomiting.      /82   Pulse (!) 117   Temp (!) 35.7 °C (96.2 °F)   Resp 16   Ht 1.854 m (6' 1\")   Wt 116.9 kg (257 lb 11.5 oz)   SpO2 92%   BMI 34.00 kg/m²     Pt ambulatory to triage for above, steady on feet. Appears generally red in appearance, pt states this is new as well. EKG completed in triage. Charge RN notified of pt as sepsis score is 3. Pt returned to [facility], educated on triage process, instructed to notify staff of worsening symptoms/concerns.   "

## 2018-08-19 NOTE — CARE PLAN
Problem: Bowel/Gastric:  Goal: Normal bowel function is maintained or improved  Outcome: PROGRESSING SLOWER THAN EXPECTED  Pt needs to have BM, Senna scheduled for this morning.    Problem: Pain Management  Goal: Pain level will decrease to patient's comfort goal  Outcome: PROGRESSING AS EXPECTED  Pain controlled with PRN Oxy 5mg when pt arrived on the floor.

## 2018-08-19 NOTE — PROGRESS NOTES
[facility] Hospitalist Progress Note    Date of Service: 2018    Chief Complaint  32 y.o. male admitted 2018 with left obstructing ureteral stone status post cystoscopy and stent placed    Interval Problem Update  Patient status post cystoscopy and stent placement, complains of left lower quadrant abdominal pain controlled on present regimen.  Patient encouraged to take p.o. pain medications rather than IV to control his pain.    Consultants/Specialty  Urology    Disposition  Likely home in 1-2 days        Review of Systems   Constitutional: Negative for chills and fever.   HENT: Negative for hearing loss.    Respiratory: Negative for cough, shortness of breath and stridor.    Cardiovascular: Negative for chest pain and leg swelling.   Gastrointestinal: Positive for abdominal pain (LLQ). Negative for blood in stool, constipation, diarrhea and melena.   Genitourinary: Negative for dysuria, flank pain, frequency, hematuria and urgency.   Musculoskeletal: Negative for back pain and joint pain.   Neurological: Negative for dizziness.   Psychiatric/Behavioral: Negative for depression. The patient does not have insomnia.       Physical Exam  Laboratory/Imaging   Hemodynamics  Temp (24hrs), Av.3 °C (97.3 °F), Min:35.7 °C (96.2 °F), Max:37 °C (98.6 °F)   Temperature: 36.3 °C (97.4 °F)  Pulse  Av.5  Min: 80  Max: 117 Heart Rate (Monitored): 99  Blood Pressure: 107/67, NIBP: 116/69      Respiratory      Respiration: 18, Pulse Oximetry: 93 %        RUL Breath Sounds: Diminished, RML Breath Sounds: Diminished, RLL Breath Sounds: Diminished, DAYSI Breath Sounds: Diminished, LLL Breath Sounds: Diminished    Fluids    Intake/Output Summary (Last 24 hours) at 18 0933  Last data filed at 18 0725   Gross per 24 hour   Intake              260 ml   Output              520 ml   Net             -260 ml       Nutrition  Orders Placed This Encounter   Procedures   • Diet Order Regular     Standing Status:    Standing     Number of Occurrences:   1     Order Specific Question:   Diet:     Answer:   Regular [1]     Physical Exam   Constitutional: He is oriented to person, place, and time. No distress.   Obese Male pleasant, cooperative   HENT:   Head: Normocephalic and atraumatic.   Right Ear: External ear normal.   Left Ear: External ear normal.   Eyes: Conjunctivae are normal. Right eye exhibits no discharge. Left eye exhibits no discharge.   Neck: Normal range of motion. Neck supple.   Cardiovascular: Normal rate, regular rhythm and normal heart sounds.    Pulmonary/Chest: Effort normal and breath sounds normal. No stridor. No respiratory distress. He exhibits no tenderness.   Abdominal: Soft. There is tenderness (LLQ). There is no rebound and no guarding.   Musculoskeletal: He exhibits no edema.   Neurological: He is alert and oriented to person, place, and time.   Skin: Skin is warm and dry. He is not diaphoretic.   Psychiatric: He has a normal mood and affect. His behavior is normal. Thought content normal.       Recent Labs      08/18/18 2024   WBC  11.3*   RBC  5.36   HEMOGLOBIN  15.4   HEMATOCRIT  45.6   MCV  85.1   MCH  28.7   MCHC  33.8   RDW  40.5   PLATELETCT  176   MPV  9.3     Recent Labs      08/18/18 2024   SODIUM  135   POTASSIUM  3.1*   CHLORIDE  103   CO2  21   GLUCOSE  115*   BUN  13   CREATININE  0.94   CALCIUM  8.7                      Assessment/Plan     * Sepsis due to urinary tract infection (HCC)   Assessment & Plan    This is sepsis (without associated acute organ dysfunction).   Urinary tract infection with obstructive uropathy  Continue with IV antibiotics  Follow cultures, NGTD  De-escalate therapy as clinically appropriate        Kidney stone   Assessment & Plan    Obstructing stone in the left distal ureter with upstream collecting system dilation.  Urology consulting, patient status post cystoscopy and stent placement.  Patient is to follow-up in a few weeks with urology as an  outpatient for stent removal.  Continue pain control, IV fluids        Chest pain   Assessment & Plan    Resolved, Likely related to acute infection with associated shortness of breath   Cont to monitor negative ekg; check trop  No cardiac hx         Obesity   Assessment & Plan    Encouraged weight loss        Hypokalemia   Assessment & Plan    Replacing, BMP tomm eva           Quality-Core Measures   Reviewed items::  Labs reviewed and Medications reviewed  DVT prophylaxis pharmacological::  Heparin  Antibiotics:  Treating active infection/contamination beyond 24 hours perioperative coverage

## 2018-08-19 NOTE — CONSULTS
DATE OF SERVICE:  08/18/2018    UROLOGIC CONSULTATION    Consultation is requested by emergency room physician, Dr. Reis, regarding   left distal ureteral stone and urosepsis.    CONSULTATION PERFORMED BY:  Dr. Cr Dee, [facility].    CHIEF COMPLAINT:  This is a 32-year-old gentleman with fever, chills and   temperature reported 104.5 with a distal ureteral stone.    HISTORY OF PRESENT ILLNESS:  The patient states he was in his usual state of   health until a week ago when he was camping, he has some left flank pain;   however, in the last 24 hours, he has felt hot, lightheaded with malaise,   shaking chills and a temperature of 104.5.  He has associated urgency,   frequency and pain radiating to the left genitalia.  He denies gross   hematuria.  He denies a history of diabetes, but does have a prior history of   stone.    PAST MEDICAL HISTORY:  Noteworthy for nephrolithiasis.  He denies a history of   asthma, hypertension, hypercholesterolemia, or atherosclerotic coronary   artery disease.    PAST SURGICAL HISTORY:  He had a rhinoplasty after fractured nose.    MEDICATIONS:  As an outpatient, he has taken Tylenol, but does not take daily   medications.    ALLERGIES:  He has no known drug allergies.    FAMILY HISTORY:  Negative for nephrolithiasis.    SOCIAL HISTORY:  He is a nonsmoker, nondrinker, does not use drugs.    REVIEW OF SYSTEMS:  GENERAL:  He has had fever, chills, malaise.  HEENT:  He has not had any headache, visual changes.  RESPIRATORY:  Denies any shortness of breath, cough, but he does require nasal   cannula oxygen, which is new for him.  HEART:  Denies dyspnea on exertion.  ABDOMEN:  He has had left lower quadrant abdominal pain, but denies any blood   in the stool.  He has had nausea and vomiting.  GENITOURINARY:  See HPI.  MUSCULOSKELETAL:  Arthralgias and myalgias.  NEUROLOGICAL:  Denies seizure or discoordination.  ENDOCRINE:  Denies excessive thirst, but has had a feeling  of heat intolerance   in the last 24 hours with the septic episode.  PSYCHIATRIC:  Denies anxiety, depression.    PHYSICAL EXAMINATION:  GENERAL:  He is a well-developed, well-nourished male, in moderate distress   due to left flank pain.  VITAL SIGNS:  His blood is 121/82, his pulse is 115, temperature is 35.7,   respirations are 18 and symmetric.  He has 2 liters nasal cannula, satting at   92%.  He is 6 feet 1 inch, weight 117 kilograms.  HEENT:  Normocephalic, atraumatic.  Pupils equal, round.  Sclerae nonicteric.  NECK:  Supple.  I did not appreciate carotid bruit or thyromegaly.  CHEST:  Clear.  CARDIOVASCULAR:  Sinus tachycardia without a murmur.  ABDOMEN:  Soft, nondistended.  He has mild tenderness to deep palpation, but   no rebound or guarding.  No palpable masses.  BACK:  Left costovertebral tenderness.  Right side is normal.  GENITOURINARY:  Shows male circumcised phallus with some vitiligo.  Testes   descended without mass.  SKIN:  Warm and dry.  No rash.  EXTREMITIES:  No clubbing, cyanosis, or edema.  Homans sign negative.  MUSCULOSKELETAL:  Good range of motion.  No discrete tenderness.  NEUROLOGIC:  Cranial nerves II-XII intact.  Motor and sensory examination   nonfocal.  PSYCHIATRIC:  Appropriate judgment.    LABORATORY DATA:  White blood cell count is 11,300.  His creatinine is   noteworthy for 0.94 with a potassium of 3.1.  His lactic acid is 1.1.  CT scan   of the abdomen and pelvis, I personally reviewed, shows a distal left 6x4 mm   stone with associated hydroureteronephrosis.    ASSESSMENT:  Distal ureterolithiasis with fever, chills and urinalysis   consistent with possible urinary tract infection, although the nitrate is   negative and there is no bacteria seen.  This appears to be urosepsis with the   clinical findings.    PLAN:  We discussed with the patient the option of a nephrostomy tube or   stent.  He would like to proceed with a stent.  He understands the risk of   procedure  include but are not limited to risk of perioperative worsening of   his infection with urosepsis requiring intensive care unit stay, potential   risk of urethral strictures, delayed complication of manipulation of the   urethra.  He is aware of the potential need for treatment in the future and   tonight would only be stent placement.  If a stent fails, nephrostomy tube   would be ordered.  He is aware of the risk of ureteral perforation and   associated stent pain, urgency, frequency and the perioperative risk of deep   vein thrombosis, pulmonary embolism, aspiration pneumonia and death.  Informed   consent was given to me by the patient to proceed in the presence of his wife   and he has marked in the preoperative holding area on his left thigh with my   initials and Y for proper site surgery.       ____________________________________     MD ALMA DALE / NTS    DD:  08/18/2018 23:36:20  DT:  08/19/2018 01:34:59    D#:  [id number]  Job#:  [id number]

## 2018-08-19 NOTE — OR SURGEON
Immediate Post OP Note    PreOp Diagnosis: Urosepsis                                Distal obstructing left ureteral stone    PostOp Diagnosis: As above    Procedure(s):  CYSTOSCOPY  LEFT 6 Albanian x 26cm  STENT PLACEMENT - Wound Class: Clean Contaminated    Surgeon(s):  Cr Dee M.D.    Anesthesiologist/Type of Anesthesia:  Anesthesiologist: Rich Ceja M.D./General ETT    Surgical Staff:  Circulator: Isabelle Bobo R.N.  Scrub Person: Estefania Sutton    Specimens removed if any:  None    Estimated Blood Loss: N/A    Findings: High grade obstruction due to impacted distal left ureteral stone    Complications: None        8/19/2018 12:12 AM Cr Dee M.D.

## 2018-08-19 NOTE — H&P
Hospital Medicine History & Physical Note    Date of Service  8/18/2018    Primary Care Physician  Pcp Pt States None    Consultants  Urology     Code Status  full    Chief Complaint  Abdominal pain and fevers.    History of Presenting Illness  32 y.o. male who presented 8/18/2018 with history of kidney stones presents to the emergency department with abdominal pain and left groin pain.  Is also been lightheaded and has had some shortness of breath with associated chest pain.  He is also had some dysuria fevers chills and sweats.  He has no alleviating or exacerbating factors to her symptoms.  He took some Tylenol that did not help with the symptoms.  He is also not on any medications and denies any other previous medical history.    Review of Systems  Review of Systems   Constitutional: Negative for chills and fever.   HENT: Negative for congestion, hearing loss and tinnitus.    Eyes: Negative for blurred vision, double vision and discharge.   Respiratory: Positive for shortness of breath. Negative for cough and hemoptysis.    Cardiovascular: Positive for chest pain. Negative for palpitations and leg swelling.   Gastrointestinal: Positive for abdominal pain, nausea and vomiting. Negative for heartburn.   Genitourinary: Positive for dysuria and flank pain.   Musculoskeletal: Negative for joint pain and myalgias.   Skin: Negative for rash.   Neurological: Negative for dizziness, sensory change, speech change, focal weakness and weakness.   Endo/Heme/Allergies: Negative for environmental allergies. Does not bruise/bleed easily.   Psychiatric/Behavioral: Negative for depression, hallucinations and substance abuse.       Past Medical History   has a past medical history of Kidney stone.    Surgical History   has a past surgical history that includes nasal fracture reduction closed.     Family History  Reviewed and noncontributory    Social History   reports that he has never smoked. He has never used smokeless tobacco.  He reports that he does not drink alcohol or use drugs.    Allergies  No Known Allergies    Medications  None       Physical Exam  Blood Pressure: 124/74   Temperature: (!) 35.7 °C (96.2 °F)   Pulse: 90   Respiration: 16   Pulse Oximetry: 94 %     Physical Exam   Constitutional: He is oriented to person, place, and time. He appears well-developed and well-nourished.   HENT:   Head: Normocephalic and atraumatic.   Eyes: Pupils are equal, round, and reactive to light. Conjunctivae and EOM are normal.   Neck: Normal range of motion. Neck supple. No JVD present.   Cardiovascular: Normal rate, regular rhythm, normal heart sounds and intact distal pulses.    No murmur heard.  Pulmonary/Chest: Effort normal and breath sounds normal. No respiratory distress. He exhibits no tenderness.   Abdominal: Soft. Bowel sounds are normal. He exhibits no distension. There is tenderness.   Left lower quadrant and flank ttp   Musculoskeletal: Normal range of motion. He exhibits no edema.   Neurological: He is alert and oriented to person, place, and time. No cranial nerve deficit. He exhibits normal muscle tone.   Skin: Skin is warm and dry. No erythema.   Psychiatric: He has a normal mood and affect. His behavior is normal. Judgment and thought content normal.   Nursing note and vitals reviewed.      Laboratory:  Recent Labs      08/18/18 2024   WBC  11.3*   RBC  5.36   HEMOGLOBIN  15.4   HEMATOCRIT  45.6   MCV  85.1   MCH  28.7   MCHC  33.8   RDW  40.5   PLATELETCT  176   MPV  9.3     Recent Labs      08/18/18 2024   SODIUM  135   POTASSIUM  3.1*   CHLORIDE  103   CO2  21   GLUCOSE  115*   BUN  13   CREATININE  0.94   CALCIUM  8.7     Recent Labs      08/18/18 2024   ALTSGPT  34   ASTSGOT  22   ALKPHOSPHAT  41   TBILIRUBIN  1.2   GLUCOSE  115*                 No results found for: TROPONINI    Urinalysis:    Recent Labs      08/18/18 2027   SPECGRAVITY  1.030   GLUCOSEUR  Negative   KETONES  Negative   NITRITE  Negative    LEUKESTERAS  Trace*   WBCURINE  2-5*   RBCURINE  5-10*   BACTERIA  Negative   EPITHELCELL  Few        Imaging:  CT-ABDOMEN-PELVIS W/O   Final Result         1. Obstructing 5x9 mm calculus in the distal left ureter with mild upstream collecting system dilatation. This is probably the ureteral stone seen in April 2018. This stone has moved about 5 cm more distally.      2. Nonobstructive tiny right intrarenal calculus.      DX-CHEST-PORTABLE (1 VIEW)   Final Result         1. No acute cardiopulmonary abnormalities are identified.            Assessment/Plan:  I anticipate this patient will require at least two midnights for appropriate medical management, necessitating inpatient admission.    * Sepsis due to urinary tract infection (HCC)   Assessment & Plan    This is sepsis (without associated acute organ dysfunction).   Urinary tract infection with obstructive uropathy  Continue with IV antibiotics  Follow cultures  De-escalate therapy as clinically appropriate        Kidney stone   Assessment & Plan    Obstructing stone in the left distal ureter with upstream collecting system dilation.  Urology was consulted for surgical evaluation patient ordered n.p.o. Status.  Pain control IV fluids        Chest pain   Assessment & Plan    Likely related to acute infection with assocaited shortness of breath   Cont to monitor negative ekg; check trop  No cardiac hx         Obesity   Assessment & Plan    Encouraged weight loss        Hypokalemia   Assessment & Plan    Replace and recheck             VTE prophylaxis: heparin

## 2018-08-19 NOTE — CARE PLAN
Problem: Knowledge Deficit  Goal: Knowledge of disease process/condition, treatment plan, diagnostic tests, and medications will improve  Outcome: PROGRESSING AS EXPECTED  Patient updated on plan of care. All questions answered. Patient verbalized understanding.

## 2018-08-20 ENCOUNTER — PATIENT OUTREACH (OUTPATIENT)
Dept: HEALTH INFORMATION MANAGEMENT | Facility: OTHER | Age: 33
End: 2018-08-20

## 2018-08-20 VITALS
DIASTOLIC BLOOD PRESSURE: 65 MMHG | HEIGHT: 73 IN | HEART RATE: 84 BPM | WEIGHT: 257.72 LBS | RESPIRATION RATE: 17 BRPM | BODY MASS INDEX: 34.16 KG/M2 | OXYGEN SATURATION: 91 % | SYSTOLIC BLOOD PRESSURE: 94 MMHG | TEMPERATURE: 97.4 F

## 2018-08-20 PROBLEM — R07.9 CHEST PAIN: Status: RESOLVED | Noted: 2018-08-18 | Resolved: 2018-08-20

## 2018-08-20 PROBLEM — E87.6 HYPOKALEMIA: Status: RESOLVED | Noted: 2018-08-18 | Resolved: 2018-08-20

## 2018-08-20 PROBLEM — A41.9 SEPSIS DUE TO URINARY TRACT INFECTION (HCC): Status: RESOLVED | Noted: 2018-08-18 | Resolved: 2018-08-20

## 2018-08-20 PROBLEM — N39.0 SEPSIS DUE TO URINARY TRACT INFECTION (HCC): Status: RESOLVED | Noted: 2018-08-18 | Resolved: 2018-08-20

## 2018-08-20 PROBLEM — N20.0 KIDNEY STONE: Status: RESOLVED | Noted: 2018-08-18 | Resolved: 2018-08-20

## 2018-08-20 LAB
ANION GAP SERPL CALC-SCNC: 6 MMOL/L (ref 0–11.9)
BACTERIA UR CULT: NORMAL
BUN SERPL-MCNC: 11 MG/DL (ref 8–22)
CALCIUM SERPL-MCNC: 8.2 MG/DL (ref 8.5–10.5)
CHLORIDE SERPL-SCNC: 104 MMOL/L (ref 96–112)
CO2 SERPL-SCNC: 26 MMOL/L (ref 20–33)
CREAT SERPL-MCNC: 0.97 MG/DL (ref 0.5–1.4)
GLUCOSE SERPL-MCNC: 104 MG/DL (ref 65–99)
POTASSIUM SERPL-SCNC: 4 MMOL/L (ref 3.6–5.5)
SIGNIFICANT IND 70042: NORMAL
SITE SITE: NORMAL
SODIUM SERPL-SCNC: 136 MMOL/L (ref 135–145)
SOURCE SOURCE: NORMAL

## 2018-08-20 PROCEDURE — 80048 BASIC METABOLIC PNL TOTAL CA: CPT

## 2018-08-20 PROCEDURE — 36415 COLL VENOUS BLD VENIPUNCTURE: CPT

## 2018-08-20 PROCEDURE — A9270 NON-COVERED ITEM OR SERVICE: HCPCS | Performed by: HOSPITALIST

## 2018-08-20 PROCEDURE — 700102 HCHG RX REV CODE 250 W/ 637 OVERRIDE(OP): Performed by: HOSPITALIST

## 2018-08-20 PROCEDURE — 99239 HOSP IP/OBS DSCHRG MGMT >30: CPT | Performed by: INTERNAL MEDICINE

## 2018-08-20 PROCEDURE — 700105 HCHG RX REV CODE 258: Performed by: HOSPITALIST

## 2018-08-20 PROCEDURE — 700111 HCHG RX REV CODE 636 W/ 250 OVERRIDE (IP): Performed by: HOSPITALIST

## 2018-08-20 RX ORDER — ONDANSETRON 4 MG/1
4 TABLET, FILM COATED ORAL EVERY 4 HOURS PRN
Qty: 20 TAB | Refills: 0 | Status: ON HOLD | OUTPATIENT
Start: 2018-08-20 | End: 2018-09-06

## 2018-08-20 RX ORDER — OXYCODONE HYDROCHLORIDE 5 MG/1
5 TABLET ORAL EVERY 8 HOURS PRN
Qty: 20 TAB | Refills: 0 | Status: SHIPPED | OUTPATIENT
Start: 2018-08-20 | End: 2018-08-27

## 2018-08-20 RX ORDER — CIPROFLOXACIN 500 MG/1
500 TABLET, FILM COATED ORAL 2 TIMES DAILY
Qty: 12 TAB | Refills: 0 | Status: SHIPPED | OUTPATIENT
Start: 2018-08-20 | End: 2018-08-26

## 2018-08-20 RX ADMIN — PIPERACILLIN AND TAZOBACTAM 4.5 G: 4; .5 INJECTION, POWDER, LYOPHILIZED, FOR SOLUTION INTRAVENOUS; PARENTERAL at 03:57

## 2018-08-20 RX ADMIN — HEPARIN SODIUM 5000 UNITS: 5000 INJECTION, SOLUTION INTRAVENOUS; SUBCUTANEOUS at 05:14

## 2018-08-20 RX ADMIN — ONDANSETRON 4 MG: 4 TABLET, ORALLY DISINTEGRATING ORAL at 12:34

## 2018-08-20 RX ADMIN — OXYCODONE HYDROCHLORIDE 10 MG: 10 TABLET ORAL at 03:56

## 2018-08-20 RX ADMIN — OXYCODONE HYDROCHLORIDE 10 MG: 10 TABLET ORAL at 07:25

## 2018-08-20 RX ADMIN — OXYCODONE HYDROCHLORIDE 10 MG: 10 TABLET ORAL at 00:30

## 2018-08-20 RX ADMIN — ONDANSETRON 4 MG: 2 INJECTION, SOLUTION INTRAMUSCULAR; INTRAVENOUS at 05:14

## 2018-08-20 RX ADMIN — OXYCODONE HYDROCHLORIDE 10 MG: 10 TABLET ORAL at 10:55

## 2018-08-20 ASSESSMENT — PATIENT HEALTH QUESTIONNAIRE - PHQ9
SUM OF ALL RESPONSES TO PHQ9 QUESTIONS 1 AND 2: 0
1. LITTLE INTEREST OR PLEASURE IN DOING THINGS: NOT AT ALL
2. FEELING DOWN, DEPRESSED, IRRITABLE, OR HOPELESS: NOT AT ALL

## 2018-08-20 ASSESSMENT — PAIN SCALES - GENERAL
PAINLEVEL_OUTOF10: 7
PAINLEVEL_OUTOF10: 7
PAINLEVEL_OUTOF10: 6
PAINLEVEL_OUTOF10: 7

## 2018-08-20 NOTE — PROGRESS NOTES
Pt discharged. Wife at bedside. Teaching complete and signed. Narcotic agreement reviewed and signed.  Pt advised to continue aggressive IS use anf f/u with PCP this week to check O2 saturation per Dr. Patel.  Belongings identified and with patient, 1 narcotic script provided and abx script sent to [facility].  IV dc's and site wnl.  No other needs or concerns.  Pt wheeled to curb by CNA.

## 2018-08-20 NOTE — PROGRESS NOTES
Pt had 1 episode of nausea with emesis.  Dr. Patel aware.  Provide zofran and monitor per MD until nausea resolves and then may D/C.  Dr. Patel calling order of zofran to pharmacy.  Will monitor,

## 2018-08-20 NOTE — PROGRESS NOTES
Assume care.  Pt awake, alert, appropriate- eating breakfast without issue.  Wife at bedside.  Pt reports pain 4/10-tolerable.  States pain  medication is effective, still experiencing intense pain with urination.  O2 ambulation trial conducted and Dr. Patel notified of result.  Awaiting urology rounding.  No other needs or concerns will monitor.

## 2018-08-20 NOTE — DISCHARGE INSTRUCTIONS
Discharge Instructions    Discharged to home by car with relative. Discharged via wheelchair, hospital escort: Yes.  Special equipment needed: Not Applicable    Be sure to schedule a follow-up appointment with your primary care doctor or any specialists as instructed.     Discharge Plan:   Influenza Vaccine Indication: Not indicated: Previously immunized this influenza season and > 8 years of age    I understand that a diet low in cholesterol, fat, and sodium is recommended for good health. Unless I have been given specific instructions below for another diet, I accept this instruction as my diet prescription.   Other diet: regular    Special Instructions: None    · Is patient discharged on Warfarin / Coumadin?   No   Lithotripsy  Lithotripsy is a treatment that can sometimes help eliminate kidney stones and pain that they cause. A form of lithotripsy, also known as extracorporeal shock wave lithotripsy, is a nonsurgical procedure that helps your body rid itself of the kidney stone when it is too big to pass on its own. Extracorporeal shock wave lithotripsy is a method of crushing a kidney stone with shock waves. These shock waves pass through your body and are focused on your stone. They cause the kidney stones to crumble while still in the urinary tract. It is then easier for the smaller pieces of stone to pass in the urine.  Lithotripsy usually takes about an hour. It is done in a hospital, a lithotripsy center, or a mobile unit. It usually does not require an overnight stay. Your health care provider will instruct you on preparation for the procedure. Your health care provider will tell you what to expect afterward.  LET YOUR HEALTH CARE PROVIDER KNOW ABOUT:  Any allergies you have.  All medicines you are taking, including vitamins, herbs, eye drops, creams, and over-the-counter medicines.  Previous problems you or members of your family have had with the use of anesthetics.  Any blood disorders you  have.  Previous surgeries you have had.  Medical conditions you have.  RISKS AND COMPLICATIONS  Generally, lithotripsy for kidney stones is a safe procedure. However, as with any procedure, complications can occur. Possible complications include:  Infection.  Bleeding of the kidney.  Bruising of the kidney or skin.  Obstruction of the ureter.  Failure of the stone to fragment.  BEFORE THE PROCEDURE  Do not eat or drink for 6-8 hours prior to the procedure. You may, however, take the medications with a sip of water that your physician instructs you to take  Do not take aspirin or aspirin-containing products for 7 days prior to your procedure  Do not take nonsteroidal anti-inflammatory products for 7 days prior to your procedure  PROCEDURE  A stent (flexible tube with holes) may be placed in your ureter. The ureter is the tube that transports the urine from the kidneys to the bladder. Your health care provider may place a stent before the procedure. This will help keep urine flowing from the kidney if the fragments of the stone block the ureter. You may have an IV tube placed in one of your veins to give you fluids and medicines. These medicines may help you relax or make you sleep. During the procedure, you will lie comfortably on a fluid-filled cushion or in a warm-water bath. After an X-ray or ultrasound exam to locate your stone, shock waves are aimed at the stone. If you are awake, you may feel a tapping sensation as the shock waves pass through your body. If large stone particles remain after treatment, a second procedure may be necessary at a later date.  For comfort during the test:  Relax as much as possible.  Try to remain still as much as possible.  Try to follow instructions to speed up the test.  Let your health care provider know if you are uncomfortable, anxious, or in pain.  AFTER THE PROCEDURE   After surgery, you will be taken to the recovery area. A nurse will watch and check your progress. Once  you're awake, stable, and taking fluids well, you will be allowed to go home as long as there are no problems. You will also be allowed to pass your urine before discharge. You may be given antibiotics to help prevent infection. You may also be prescribed pain medicine if needed. In a week or two, your health care provider may remove your stent, if you have one. You may first have an X-ray exam to check on how successful the fragmentation of your stone has been and how much of the stone has passed. Your health care provider will check to see whether or not stone particles remain.  SEEK IMMEDIATE MEDICAL CARE IF:  You develop a fever or shaking chills.  Your pain is not relieved by medicine.  You feel sick to your stomach (nauseated) and you vomit.  You develop heavy bleeding.  You have difficulty urinating.  You start to pass your stent from your penis.  This information is not intended to replace advice given to you by your health care provider. Make sure you discuss any questions you have with your health care provider.  Document Released: 12/15/2001 Document Revised: 01/08/2016 Document Reviewed: 07/03/2014  [employer] Interactive Patient Education © 2017 [employer]    Cystoscopy  Cystoscopy is a procedure that is used to help diagnose and sometimes treat conditions that affect that lower urinary tract. The lower urinary tract includes the bladder and the tube that drains urine from the bladder out of the body (urethra). Cystoscopy is performed with a thin, tube-shaped instrument with a light and camera at the end (cystoscope). The cystoscope may be hard (rigid) or flexible, depending on the goal of the procedure. The cystoscope is inserted through the urethra, into the bladder.  Cystoscopy may be recommended if you have:  · Urinary tractinfections that keep coming back (recurring).  · Blood in the urine (hematuria).  · Loss of bladder control (urinary incontinence) or an overactive bladder.  · Unusual cells  found in a urine sample.  · A blockage in the urethra.  · Painful urination.  · An abnormality in the bladder found during an intravenous pyelogram (IVP) or CT scan.  Cystoscopy may also be done to remove a sample of tissue to be examined under a microscope (biopsy).  Tell a health care provider about:  · Any allergies you have.  · All medicines you are taking, including vitamins, herbs, eye drops, creams, and over-the-counter medicines.  · Any problems you or family members have had with anesthetic medicines.  · Any blood disorders you have.  · Any surgeries you have had.  · Any medical conditions you have.  · Whether you are pregnant or may be pregnant.  What are the risks?  Generally, this is a safe procedure. However, problems may occur, including:  · Infection.  · Bleeding.  · Allergic reactions to medicines.  · Damage to other structures or organs.  What happens before the procedure?  · Ask your health care provider about:  ¨ Changing or stopping your regular medicines. This is especially important if you are taking diabetes medicines or blood thinners.  ¨ Taking medicines such as aspirin and ibuprofen. These medicines can thin your blood. Do not take these medicines before your procedure if your health care provider instructs you not to.  · Follow instructions from your health care provider about eating or drinking restrictions.  · You may be given antibiotic medicine to help prevent infection.  · You may have an exam or testing, such as X-rays of the bladder, urethra, or kidneys.  · You may have urine tests to check for signs of infection.  · Plan to have someone take you home after the procedure.  What happens during the procedure?  · To reduce your risk of infection, your health care team will wash or sanitize their hands.  · You will be given one or more of the following:  ¨ A medicine to help you relax (sedative).  ¨ A medicine to numb the area (local anesthetic).  · The area around the opening of your  urethra will be cleaned.  · The cystoscope will be passed through your urethra into your bladder.  · Germ-free (sterile) fluid will flow through the cystoscope to fill your bladder. The fluid will stretch your bladder so that your surgeon can clearly examine your bladder walls.  · The cystoscope will be removed and your bladder will be emptied.  The procedure may vary among health care providers and hospitals.  What happens after the procedure?  · You may have some soreness or pain in your abdomen and urethra. Medicines will be available to help you.  · You may have some blood in your urine.  · Do not drive for 24 hours if you received a sedative.  This information is not intended to replace advice given to you by your health care provider. Make sure you discuss any questions you have with your health care provider.  Document Released: 12/15/2001 Document Revised: 04/27/2017 Document Reviewed: 11/03/2016  [employer] Interactive Patient Education © 2017 [employer]      Depression / Suicide Risk    As you are discharged from this [facility] facility, it is important to learn how to keep safe from harming yourself.    Recognize the warning signs:  · Abrupt changes in personality, positive or negative- including increase in energy   · Giving away possessions  · Change in eating patterns- significant weight changes-  positive or negative  · Change in sleeping patterns- unable to sleep or sleeping all the time   · Unwillingness or inability to communicate  · Depression  · Unusual sadness, discouragement and loneliness  · Talk of wanting to die  · Neglect of personal appearance   · Rebelliousness- reckless behavior  · Withdrawal from people/activities they love  · Confusion- inability to concentrate     If you or a loved one observes any of these behaviors or has concerns about self-harm, here's what you can do:  · Talk about it- your feelings and reasons for harming yourself  · Remove any means that you might use to  hurt yourself (examples: pills, rope, extension cords, firearm)  · Get professional help from the community (Mental Health, Substance Abuse, psychological counseling)  · Do not be alone:Call your Safe Contact- someone whom you trust who will be there for you.  · Call your local CRISIS HOTLINE 529-9933 or 271-529-3874  · Call your local Children's Mobile Crisis Response Team [place] (772) 277-7761 or www.Talicious  · Call the toll free National Suicide Prevention Hotlines   · National Suicide Prevention Lifeline 503-040-NTYU (2760)  · National Hope Line Network 800-SUICIDE (517-6025)

## 2018-08-20 NOTE — PROGRESS NOTES
Pt states nausea resolved.  Pt and wife comfortable being discharged.  Pt wheeled to curb by RN where wife was waiting with vehicle.

## 2018-08-20 NOTE — PROGRESS NOTES
Spoke with urology DONNIE donaldson to discharge.  Have patient call urology clinic in two days to schedule follow lithotripsy.

## 2018-08-20 NOTE — PROGRESS NOTES
Assumed care of patient at 1900    Pt is A&O 4  Pt complains of lower abdominal pain, controlled with oral pain medications  denies nausea  Tolerating Regular Diet  Abdomen tender  Pt has dysuria, urine dark sarah  Up self  Pt on heparin for VTE  Reviewed plan of care with patient, bed in lowest position and locked, pt resting comfortably now, call light within reach, all needs met at this time

## 2018-08-20 NOTE — DISCHARGE SUMMARY
"Discharge Summary    CHIEF COMPLAINT ON ADMISSION  Chief Complaint   Patient presents with   • Chest Pain     Since last night, \"I'm having chest pains, hot flashes, fevers, and just not feeling good, and it's gotten worse today.    • Fever     Reports was up to 104.5. Last took tylenol at 19:00, temp now 98.6 oral.    • Abdominal Pain     radiating to groin, nausea but denies vomiting.        Reason for Admission  Chest pain; fever     Admission Date  8/18/2018    CODE STATUS  Full Code    HPI & HOSPITAL COURSE  Please see H&P dictated by Dr. Hu for further details.  This is a 32 y.o. male here with abdominal pain secondary to obstructing stone in the left distal ureter.  Patient had a CT abdomen and pelvis without contrast which showed obstructing 5 x 9 mm calculus in the distal left ureter with mild upstream collecting system dilation.  He is also noted to have nonobstructing tiny right intrarenal calculus.  Urology was consulted and patient underwent cystoscopy with stent placement.  Patient's pain improved during his hospital stay and he is discharged home in stable condition.  Patient was provided with a work note and was advised to return to work with light duty.  He is to follow-up with urology in a few weeks for stent removal.  Plan of care was discussed with patient and wife at bedside and all questions were answered peer       Therefore, he is discharged in good and stable condition to home with close outpatient follow-up.    The patient met 2-midnight criteria for an inpatient stay at the time of discharge.    Discharge Date  8/20/18    FOLLOW UP ITEMS POST DISCHARGE  Urology in 2-3 weeks    DISCHARGE DIAGNOSES  Principal Problem (Resolved):    Sepsis due to urinary tract infection (HCC) POA: Unknown  Active Problems:    Obesity POA: Unknown  Resolved Problems:    Kidney stone POA: Unknown    Hypokalemia POA: Unknown    Chest pain POA: Unknown      FOLLOW UP    Cr Dee M.D.  [address] "   [facility] [place], [place] [address]  [phone]    [facility]  called office and they state that they will call you to schedule your follow up appointment. IIf you do not hear from them please call to schedule . Thank you     JEWELS Truong  [address] [facility] [address]  [phone]    Go on 8/31/2018  Please arrive at 8:15 am for your appointment at [facility]. Thank you       MEDICATIONS ON DISCHARGE     Medication List      START taking these medications      Instructions   ciprofloxacin 500 MG Tabs  Commonly known as:  CIPRO   Take 1 Tab by mouth 2 times a day for 6 days.  Dose:  500 mg     ondansetron 4 MG Tabs tablet  Commonly known as:  ZOFRAN   Take 1 Tab by mouth every four hours as needed for Nausea/Vomiting.  Dose:  4 mg     oxyCODONE immediate-release 5 MG Tabs  Commonly known as:  ROXICODONE   Take 1 Tab by mouth every 8 hours as needed for Severe Pain for up to 7 days.  Dose:  5 mg            Allergies  No Known Allergies    DIET  Orders Placed This Encounter   Procedures   • Diet Order Regular     Standing Status:   Standing     Number of Occurrences:   1     Order Specific Question:   Diet:     Answer:   Regular [1]       ACTIVITY  Light duty.  Weight bearing as tolerated    CONSULTATIONS  Dr. Dee - Urology    PROCEDURES  Cystoscopy with stent placement    LABORATORY  Lab Results   Component Value Date    SODIUM 136 08/20/2018    POTASSIUM 4.0 08/20/2018    CHLORIDE 104 08/20/2018    CO2 26 08/20/2018    GLUCOSE 104 (H) 08/20/2018    BUN 11 08/20/2018    CREATININE 0.97 08/20/2018        Lab Results   Component Value Date    WBC 11.3 (H) 08/18/2018    HEMOGLOBIN 15.4 08/18/2018    HEMATOCRIT 45.6 08/18/2018    PLATELETCT 176 08/18/2018      This dictation was created using voice recognition software. The accuracy of the dictation is limited to the abilities of the software. I expect there may be some errors of grammar and possibly content.    Total time of the discharge  process exceeds 38 minutes.

## 2018-08-20 NOTE — CARE PLAN
Problem: Communication  Goal: The ability to communicate needs accurately and effectively will improve  Outcome: PROGRESSING AS EXPECTED  Plan of care discussed with patient at beginning of shift    Problem: Pain Management  Goal: Pain level will decrease to patient's comfort goal  Outcome: PROGRESSING AS EXPECTED  Moderate pain, controlled with oral analgesics. Discussed medication schedule with patient

## 2018-08-21 NOTE — OP REPORT
DATE OF SERVICE:  08/19/2018    PREOPERATIVE DIAGNOSES:  Urosepsis, left distal ureterolithiasis.    OPERATION/PROCEDURE PERFORMED:  1.  Rigid cystourethroscopy.  2.  Left 6-Japanese x 26 cm stent placement.    SURGEON:  Cr Dee MD    ANESTHESIA:  General endotracheal.    ANESTHESIOLOGIST:  Rich Ceja MD    POSTOPERATIVE DIAGNOSES:  Urosepsis, left distal ureterolithiasis.    COMPLICATIONS:  None.    DRAINS:  6-Japanese x 26 cm stent.    INDICATIONS:  The patient is a 32-year-old male with a history of left distal   ureteral stone and urosepsis.  He has hypoxia, fever to 104.5 and an   obstructing stone requiring emergent drainage.  Prior to surgery, all risks   and benefits were discussed with the patient.  Informed consent was given to   me to proceed.    DESCRIPTION OF PROCEDURE:  After informed consent was obtained, the patient   was brought to the operating room and placed in supine.  General endotracheal   anesthetic was administered.  Patient had already received Zosyn in the   emergency room.  Sequential compression devices were in place and activated.    He was positioned in modified lithotomy.  The operative area was Betadine   prepped and draped in usual sterile fashion.  Surgical time-out was called.    All members of the operative team agree as the patient's name, procedure to be   performed with no objections, attention was directed to the procedure.  I   passed a generously lubricated 25-Japanese rigid cystoscope per urethra with   30-degree lens.  The anterior urethra was normal.  Prostatic fossa measured 4   cm in length, slightly elevated median bar.  Upon entering the bladder, serial   evaluation shows no evidence of stones, tumors, or diverticula.  Right   ureteral orifice has clear efflux.  There is no reflux noted from the left   ureteral orifice.  I cannulated the orifice with a 0.35 straight tip sensor   wire to back and forth motion.  I was able to get the wire up into the kidney   as  documented fluoroscopy and then pushed up a 6-Maori x 26 cm stent.  When I   withdrew the guidewire, there was excellent coil in kidney and high pressure   drainage from the islet of the stent in the bladder with what appeared to be   debris and infected urine.  The bladder was drained.  He tolerated the   procedure well, was awake in the operating room and transferred to recovery   room where he arrived in stable condition.       ____________________________________     MD ERROL DALE / ROBERT    DD:  08/19/2018 00:16:39  DT:  08/19/2018 00:56:38    D#:  [id number]  Job#:  [id number]

## 2018-08-23 ENCOUNTER — HOSPITAL ENCOUNTER (EMERGENCY)
Facility: MEDICAL CENTER | Age: 33
End: 2018-08-24
Attending: EMERGENCY MEDICINE
Payer: COMMERCIAL

## 2018-08-23 DIAGNOSIS — R10.32 INTERMITTENT LEFT LOWER QUADRANT ABDOMINAL PAIN: ICD-10-CM

## 2018-08-23 DIAGNOSIS — N20.0 RENAL STONE: ICD-10-CM

## 2018-08-23 LAB
APPEARANCE UR: ABNORMAL
BACTERIA #/AREA URNS HPF: NEGATIVE /HPF
BACTERIA BLD CULT: NORMAL
BACTERIA BLD CULT: NORMAL
BILIRUB UR QL STRIP.AUTO: NEGATIVE
CAOX CRY #/AREA URNS HPF: ABNORMAL /HPF
COLOR UR: ABNORMAL
EPI CELLS #/AREA URNS HPF: ABNORMAL /HPF
GLUCOSE UR STRIP.AUTO-MCNC: NEGATIVE MG/DL
HYALINE CASTS #/AREA URNS LPF: ABNORMAL /LPF
KETONES UR STRIP.AUTO-MCNC: NEGATIVE MG/DL
LEUKOCYTE ESTERASE UR QL STRIP.AUTO: ABNORMAL
MICRO URNS: ABNORMAL
NITRITE UR QL STRIP.AUTO: NEGATIVE
PH UR STRIP.AUTO: 5.5 [PH]
PROT UR QL STRIP: 100 MG/DL
RBC # URNS HPF: ABNORMAL /HPF
RBC CASTS #/AREA URNS LPF: ABNORMAL /LPF
RBC UR QL AUTO: ABNORMAL
RENAL EPI CELLS #/AREA URNS HPF: ABNORMAL /HPF
SIGNIFICANT IND 70042: NORMAL
SIGNIFICANT IND 70042: NORMAL
SITE SITE: NORMAL
SITE SITE: NORMAL
SOURCE SOURCE: NORMAL
SOURCE SOURCE: NORMAL
SP GR UR STRIP.AUTO: 1.04
UROBILINOGEN UR STRIP.AUTO-MCNC: 1 MG/DL
WBC #/AREA URNS HPF: ABNORMAL /HPF

## 2018-08-23 PROCEDURE — 81001 URINALYSIS AUTO W/SCOPE: CPT

## 2018-08-23 PROCEDURE — 99284 EMERGENCY DEPT VISIT MOD MDM: CPT

## 2018-08-23 PROCEDURE — 96374 THER/PROPH/DIAG INJ IV PUSH: CPT

## 2018-08-23 ASSESSMENT — PAIN SCALES - GENERAL: PAINLEVEL_OUTOF10: 10

## 2018-08-23 ASSESSMENT — LIFESTYLE VARIABLES: DO YOU DRINK ALCOHOL: NO

## 2018-08-24 ENCOUNTER — APPOINTMENT (OUTPATIENT)
Dept: RADIOLOGY | Facility: MEDICAL CENTER | Age: 33
End: 2018-08-24
Attending: EMERGENCY MEDICINE
Payer: COMMERCIAL

## 2018-08-24 VITALS
RESPIRATION RATE: 18 BRPM | SYSTOLIC BLOOD PRESSURE: 148 MMHG | TEMPERATURE: 96.9 F | BODY MASS INDEX: 33.54 KG/M2 | HEIGHT: 73 IN | DIASTOLIC BLOOD PRESSURE: 102 MMHG | OXYGEN SATURATION: 88 % | HEART RATE: 76 BPM | WEIGHT: 253.09 LBS

## 2018-08-24 LAB
ANION GAP SERPL CALC-SCNC: 12 MMOL/L (ref 0–11.9)
BASOPHILS # BLD AUTO: 0.9 % (ref 0–1.8)
BASOPHILS # BLD: 0.1 K/UL (ref 0–0.12)
BUN SERPL-MCNC: 18 MG/DL (ref 8–22)
CALCIUM SERPL-MCNC: 8.9 MG/DL (ref 8.5–10.5)
CHLORIDE SERPL-SCNC: 104 MMOL/L (ref 96–112)
CO2 SERPL-SCNC: 22 MMOL/L (ref 20–33)
CREAT SERPL-MCNC: 1.07 MG/DL (ref 0.5–1.4)
EOSINOPHIL # BLD AUTO: 0.1 K/UL (ref 0–0.51)
EOSINOPHIL NFR BLD: 0.9 % (ref 0–6.9)
ERYTHROCYTE [DISTWIDTH] IN BLOOD BY AUTOMATED COUNT: 39.4 FL (ref 35.9–50)
GLUCOSE SERPL-MCNC: 139 MG/DL (ref 65–99)
HCT VFR BLD AUTO: 45.4 % (ref 42–52)
HGB BLD-MCNC: 15.6 G/DL (ref 14–18)
LYMPHOCYTES # BLD AUTO: 2 K/UL (ref 1–4.8)
LYMPHOCYTES NFR BLD: 18.2 % (ref 22–41)
MANUAL DIFF BLD: NORMAL
MCH RBC QN AUTO: 28.9 PG (ref 27–33)
MCHC RBC AUTO-ENTMCNC: 34.4 G/DL (ref 33.7–35.3)
MCV RBC AUTO: 84.2 FL (ref 81.4–97.8)
MONOCYTES # BLD AUTO: 0.29 K/UL (ref 0–0.85)
MONOCYTES NFR BLD AUTO: 2.6 % (ref 0–13.4)
MORPHOLOGY BLD-IMP: NORMAL
MYELOCYTES NFR BLD MANUAL: 0.9 %
NEUTROPHILS # BLD AUTO: 8.42 K/UL (ref 1.82–7.42)
NEUTROPHILS NFR BLD: 76.5 % (ref 44–72)
NRBC # BLD AUTO: 0 K/UL
NRBC BLD-RTO: 0 /100 WBC
PLATELET # BLD AUTO: 230 K/UL (ref 164–446)
PLATELET BLD QL SMEAR: NORMAL
PMV BLD AUTO: 9.4 FL (ref 9–12.9)
POLYCHROMASIA BLD QL SMEAR: NORMAL
POTASSIUM SERPL-SCNC: 3.7 MMOL/L (ref 3.6–5.5)
RBC # BLD AUTO: 5.39 M/UL (ref 4.7–6.1)
RBC BLD AUTO: PRESENT
SODIUM SERPL-SCNC: 138 MMOL/L (ref 135–145)
WBC # BLD AUTO: 11 K/UL (ref 4.8–10.8)

## 2018-08-24 PROCEDURE — 85027 COMPLETE CBC AUTOMATED: CPT

## 2018-08-24 PROCEDURE — 96374 THER/PROPH/DIAG INJ IV PUSH: CPT

## 2018-08-24 PROCEDURE — 80048 BASIC METABOLIC PNL TOTAL CA: CPT

## 2018-08-24 PROCEDURE — 700111 HCHG RX REV CODE 636 W/ 250 OVERRIDE (IP): Performed by: EMERGENCY MEDICINE

## 2018-08-24 PROCEDURE — 85007 BL SMEAR W/DIFF WBC COUNT: CPT

## 2018-08-24 PROCEDURE — 76775 US EXAM ABDO BACK WALL LIM: CPT

## 2018-08-24 RX ORDER — IBUPROFEN 800 MG/1
800 TABLET ORAL EVERY 6 HOURS PRN
Qty: 90 TAB | Refills: 0 | Status: SHIPPED | OUTPATIENT
Start: 2018-08-24 | End: 2021-11-29

## 2018-08-24 RX ORDER — MORPHINE SULFATE 4 MG/ML
4 INJECTION, SOLUTION INTRAMUSCULAR; INTRAVENOUS ONCE
Status: DISCONTINUED | OUTPATIENT
Start: 2018-08-24 | End: 2018-08-24 | Stop reason: HOSPADM

## 2018-08-24 RX ORDER — KETOROLAC TROMETHAMINE 30 MG/ML
30 INJECTION, SOLUTION INTRAMUSCULAR; INTRAVENOUS ONCE
Status: COMPLETED | OUTPATIENT
Start: 2018-08-24 | End: 2018-08-24

## 2018-08-24 RX ADMIN — KETOROLAC TROMETHAMINE 30 MG: 30 INJECTION, SOLUTION INTRAMUSCULAR at 01:10

## 2018-08-24 NOTE — ED PROVIDER NOTES
"ED Provider Note    Scribed for Lucie Cleary M.D. by Anthony Mckeon. 8/24/2018, 12:22 AM.    Primary care provider: Pcp Pt States None  Means of arrival: Walk In  History obtained from: Patient  History limited by: None     CHIEF COMPLAINT  Chief Complaint   Patient presents with   • Post-Op Pain     Pt had L distal uretolithiasis with stent placed on 8/19. Was dc'd 8/20. Pain increasing to LLQ abdomen over last few days.    • Dysuria     Increased pain with urination, reports he is voiding in \"little bits at a time.\"      HPI  Erik Sandoval is a 32 y.o. male who presents to the Emergency Department with dysuria and abdominal pain.   The patient presents to the ED status post lithotripsy with left distal stent placement by Dr. Dee on 8/19/18.   The patient was discharged, and reports his pain was tolerable for two days following discharge. However he reports an acute onset of worsening abdominal pain tonight at 7 PM. His pain is located in his left lower abdomen, and radiates down into his left groin. He describes his pain as \"sharp\", which he currently rates as 9/10 in severity. The patient has been medicating his pain with 5 mg Oxycodone at night without relief of pain. His last dose of Oxycodone was yesterday night. He complains of a few episodes of dysuria tonight.     He denies any fever, nausea, vomiting, diarrhea, cough, congestion, shortness of breath, or leg swelling.     REVIEW OF SYSTEMS  Positives as above. Pertinent negatives include fever, nausea, vomiting, diarrhea, cough, congestion, shortness of breath, or leg swelling.   All other review of systems are negative    PAST MEDICAL HISTORY   has a past medical history of Kidney stone.    SURGICAL HISTORY   has a past surgical history that includes nasal fracture reduction closed and cystoscopy stent placement (Left, 8/19/2018).    SOCIAL HISTORY  Social History   Substance Use Topics   • Smoking status: Never Smoker   • Smokeless tobacco: Never " "Used   • Alcohol use No      History   Drug Use No     FAMILY HISTORY  None noted.     CURRENT MEDICATIONS  Reviewed. See Encounter Summary.     ALLERGIES  No Known Allergies    PHYSICAL EXAM  VITAL SIGNS: /102   Pulse 83   Temp 36.1 °C (96.9 °F)   Resp 18   Ht 1.854 m (6' 1\")   Wt 114.8 kg (253 lb 1.4 oz)   SpO2 96%   BMI 33.39 kg/m²    Pulse ox interpretation: I interpret this pulse ox as normal.  Constitutional: Alert in no apparent distress.  HENT: Normocephalic atraumatic, MMM  Eyes: PERR, Conjunctiva normal, Non-icteric.   Neck: Normal range of motion, No tenderness, Supple, No stridor.   Lymphatic: No lymphadenopathy noted.   Cardiovascular: Regular rate and rhythm, no murmurs.   Thorax & Lungs: Normal breath sounds, No respiratory distress, No wheezing, No chest tenderness.   Abdomen: Slightly distended. Left mid and lower quadrant abdominal tenderness. Bowel sounds normal, No pulsatile masses. No peritoneal signs.  Skin: Warm, Dry, No erythema, No rash.   Back: No bony tenderness, No CVA tenderness.   Extremities: Intact distal pulses, No edema, No tenderness, No cyanosis  Musculoskeletal: Good range of motion in all major joints. No tenderness to palpation or major deformities noted.   Neurologic: Alert and oriented, No focal deficits noted          DIFFERENTIAL DIAGNOSIS AND WORK UP PLAN    12:22 AM Patient seen and examined at bedside. The patient presents with post operative abdominal pain, dysuria and the differential diagnosis includes but is not limited to: recurrent renal stone, pyelonephritis, obstructed stent, acute renal injury. Ordered for US Renal, CBC, BMP, GFR, differential, platelet, urinalysis to evaluate. Patient will be treated with Toradol IV for his symptoms.       DIAGNOSTIC STUDIES / PROCEDURES     LABS  CBC with a mildly elevated white cell count and a left shift, urinalysis with large blood lead and red blood cells no bacteria and no other signs of infection and BMP " "within normal limits  All labs were reviewed by me.    RADIOLOGY  US-RENAL   Final Result         1.  Echogenic liver compatible with fatty change versus fibrosis.   2.  Asymmetric enlargement of the left kidney.   3.  Mild left hydronephrosis        The radiologist's interpretation of all radiological studies have been reviewed by me.    COURSE & MEDICAL DECISION MAKING  Pertinent Labs & Imaging studies reviewed. (See chart for details)  Reviewed prior medical records from admission on 8/18/2018 which show:   CT Abdomen showed: 5 mm x 9 mm distal left stone. He was sent home on Cipro and Oxycodone 5 mg IR.     1:40 AM Recheck: Patient re-evaluated at beside. The patient is feeling better after Toradol IV.     1:47 AM Paged Urology.     1:50 AM Spoke with Dr. Bourne, Urologist on call for Dr. Dee, about the patient's condition. Dr. Bourne instructed that the patient will have some discomfort with stent in place, but would be okay to home home if pain is controlled. Continue with Cipro antibiotic as prescribed.     2:24 AM Recheck: Patient re-evaluated at beside. The patient has not received Morphine, but is sleeping comfortably in room. He was woken up, and states he feels comfortable at this time.   The patient feels comfortable treating his pain at home with alternating doses of Tylenol/Ibuprofen, using Oxycodone as needed.       This is a 32 y.o. year old male who presents with left lower quadrant pain and possible retained stone as they did not do lithotripsy the only place a stent, he is got pain of the stent without signs of worsening infection and he is shows no signs of sepsis I does believe he has not been treating his pain appropriately at home.  Patient will return to the ED with any new or worsening pain vomiting or fevers and will follow up with urology later today.    /73  Pulse 76   Temp 36.1 °C (96.9 °F)   Resp 18   Ht 1.854 m (6' 1\")   Wt 114.8 kg (253 lb 1.4 oz)   SpO2 88%   BMI 33.39 " kg/m²       DISPOSITION:  Patient will be discharged home in stable condition.    FOLLOW UP:  [facility], Emergency Dept  [address]  [place] [address] [phone]    If symptoms worsen    Cr Dee M.D.  [address]  [address]  [place] [address]  [phone]    Schedule an appointment as soon as possible for a visit        OUTPATIENT MEDICATIONS:  Discharge Medication List as of 8/24/2018  2:27 AM      START taking these medications    Details   ibuprofen (MOTRIN) 800 MG Tab Take 1 Tab by mouth every 6 hours as needed., Disp-90 Tab, R-0, Normal              FINAL IMPRESSION  1. Intermittent left lower quadrant abdominal pain    2. Renal stone          I, Anthony Mkceon (Scribe), am scribing for, and in the presence of, Lucie Cleary M.D..    Electronically signed by: Anthony Mckeon (Scribe), 8/24/2018    IuLcie M.D. personally performed the services described in this documentation, as scribed by Anthony Mckeon in my presence, and it is both accurate and complete.    The note accurately reflects work and decisions made by me.  Lucie Cleary  8/24/2018  5:20 AM    This dictation has been created using voice recognition software and/or scribes. The accuracy of the dictation is limited by the abilities of the software and the expertise of the scribes. I expect there may be some errors of grammar and possibly content. I made every attempt to manually correct the errors within my dictation. However, errors related to voice recognition software and/or scribes may still exist and should be interpreted within the appropriate context.

## 2018-08-24 NOTE — ED TRIAGE NOTES
"Chief Complaint   Patient presents with   • Post-Op Pain     Pt had L distal uretolithiasis with stent placed on 8/19. Was dc'd 8/20. Pain increasing to LLQ abdomen over last few days.    • Dysuria     Increased pain with urination, reports he is voiding in \"little bits at a time.\"      /102   Pulse 83   Temp 36.1 °C (96.9 °F)   Resp 18   Ht 1.854 m (6' 1\")   Wt 114.8 kg (253 lb 1.4 oz)   SpO2 96%   BMI 33.39 kg/m²     Pt ambulatory to triage, steady on feet. Reports was discharged home on antibiotics. In obvious discomfort, pacing around room, unable to sit still. Pt returned to lobby, educated on triage process, instructed to notify staff of worsening symptoms/concerns.   "

## 2018-08-24 NOTE — ED NOTES
Patient given DC paperwork and instructed to follow up with urology. Patient A&O x4 and verbalizes understanding of instructions and prescription. Patient in no apparent distress, VSS. Ambulatory without issue.

## 2018-08-24 NOTE — DISCHARGE INSTRUCTIONS
Follow up with urology  Take your antibiotics until they are complete  Rotate ibuprofen with tylenol every 3 hours for pain and take the oxy's for breakthrough pain      Renal Colic  Renal colic is pain that is caused by passing a kidney stone. The pain can be sharp and severe. It may be felt in the back, abdomen, side (flank), or groin. It can cause nausea. Renal colic can come and go.  Follow these instructions at home:  Watch your condition for any changes. The following actions may help to lessen any discomfort that you are feeling:  · Take medicines only as directed by your health care provider.  · Ask your health care provider if it is okay to take over-the-counter pain medicine.  · Drink enough fluid to keep your urine clear or pale yellow. Drink 6-8 glasses of water each day.  · Limit the amount of salt that you eat to less than 2 grams per day.  · Reduce the amount of protein in your diet. Eat less meat, fish, nuts, and dairy.  · Avoid foods such as spinach, rhubarb, nuts, or bran. These may make kidney stones more likely to form.  Contact a health care provider if:  · You have a fever or chills.  · Your urine smells bad or looks cloudy.  · You have pain or burning when you pass urine.  Get help right away if:  · Your flank pain or groin pain suddenly worsens.  · You become confused or disoriented or you lose consciousness.  This information is not intended to replace advice given to you by your health care provider. Make sure you discuss any questions you have with your health care provider.  Document Released: 09/27/2006 Document Revised: 05/23/2017 Document Reviewed: 10/28/2015  [employer] Interactive Patient Education © 2017 [employer]

## 2018-09-05 DIAGNOSIS — Z01.812 PRE-OPERATIVE LABORATORY EXAMINATION: ICD-10-CM

## 2018-09-05 LAB
APPEARANCE UR: ABNORMAL
BACTERIA #/AREA URNS HPF: NEGATIVE /HPF
BILIRUB UR QL STRIP.AUTO: ABNORMAL
COLOR UR: ABNORMAL
EPI CELLS #/AREA URNS HPF: NEGATIVE /HPF
GLUCOSE UR STRIP.AUTO-MCNC: NEGATIVE MG/DL
HYALINE CASTS #/AREA URNS LPF: ABNORMAL /LPF
KETONES UR STRIP.AUTO-MCNC: NEGATIVE MG/DL
LEUKOCYTE ESTERASE UR QL STRIP.AUTO: ABNORMAL
MICRO URNS: ABNORMAL
NITRITE UR QL STRIP.AUTO: NEGATIVE
PH UR STRIP.AUTO: 5.5 [PH]
PROT UR QL STRIP: 300 MG/DL
RBC # URNS HPF: >150 /HPF
RBC UR QL AUTO: ABNORMAL
SP GR UR STRIP.AUTO: 1.03
UROBILINOGEN UR STRIP.AUTO-MCNC: 0.2 MG/DL
WBC #/AREA URNS HPF: ABNORMAL /HPF
YEAST BUDDING URNS QL: PRESENT /HPF

## 2018-09-05 PROCEDURE — 87086 URINE CULTURE/COLONY COUNT: CPT

## 2018-09-05 PROCEDURE — 81001 URINALYSIS AUTO W/SCOPE: CPT

## 2018-09-05 RX ORDER — OXYCODONE HYDROCHLORIDE 5 MG/1
5 TABLET ORAL EVERY 4 HOURS PRN
Status: ON HOLD | COMMUNITY
End: 2018-09-06

## 2018-09-06 ENCOUNTER — APPOINTMENT (OUTPATIENT)
Dept: RADIOLOGY | Facility: MEDICAL CENTER | Age: 33
End: 2018-09-06
Attending: UROLOGY
Payer: COMMERCIAL

## 2018-09-06 ENCOUNTER — HOSPITAL ENCOUNTER (OUTPATIENT)
Facility: MEDICAL CENTER | Age: 33
End: 2018-09-06
Attending: UROLOGY | Admitting: UROLOGY
Payer: COMMERCIAL

## 2018-09-06 VITALS
DIASTOLIC BLOOD PRESSURE: 80 MMHG | HEART RATE: 88 BPM | SYSTOLIC BLOOD PRESSURE: 140 MMHG | BODY MASS INDEX: 33.13 KG/M2 | RESPIRATION RATE: 18 BRPM | OXYGEN SATURATION: 94 % | WEIGHT: 250 LBS | HEIGHT: 73 IN | TEMPERATURE: 97.2 F

## 2018-09-06 PROCEDURE — 160025 RECOVERY II MINUTES (STATS): Performed by: UROLOGY

## 2018-09-06 PROCEDURE — 700111 HCHG RX REV CODE 636 W/ 250 OVERRIDE (IP)

## 2018-09-06 PROCEDURE — 160009 HCHG ANES TIME/MIN: Performed by: UROLOGY

## 2018-09-06 PROCEDURE — 160002 HCHG RECOVERY MINUTES (STAT): Performed by: UROLOGY

## 2018-09-06 PROCEDURE — 160035 HCHG PACU - 1ST 60 MINS PHASE I: Performed by: UROLOGY

## 2018-09-06 PROCEDURE — 700101 HCHG RX REV CODE 250

## 2018-09-06 PROCEDURE — 160041 HCHG SURGERY MINUTES - EA ADDL 1 MIN LEVEL 4: Performed by: UROLOGY

## 2018-09-06 PROCEDURE — 501329 HCHG SET, CYSTO IRRIG Y TUR: Performed by: UROLOGY

## 2018-09-06 PROCEDURE — 160029 HCHG SURGERY MINUTES - 1ST 30 MINS LEVEL 4: Performed by: UROLOGY

## 2018-09-06 PROCEDURE — 700102 HCHG RX REV CODE 250 W/ 637 OVERRIDE(OP)

## 2018-09-06 PROCEDURE — 160046 HCHG PACU - 1ST 60 MINS PHASE II: Performed by: UROLOGY

## 2018-09-06 PROCEDURE — C1758 CATHETER, URETERAL: HCPCS | Performed by: UROLOGY

## 2018-09-06 PROCEDURE — 160048 HCHG OR STATISTICAL LEVEL 1-5: Performed by: UROLOGY

## 2018-09-06 PROCEDURE — A9270 NON-COVERED ITEM OR SERVICE: HCPCS

## 2018-09-06 PROCEDURE — 160047 HCHG PACU  - EA ADDL 30 MINS PHASE II: Performed by: UROLOGY

## 2018-09-06 PROCEDURE — 500879 HCHG PACK, CYSTO: Performed by: UROLOGY

## 2018-09-06 RX ORDER — SODIUM CHLORIDE, SODIUM LACTATE, POTASSIUM CHLORIDE, CALCIUM CHLORIDE 600; 310; 30; 20 MG/100ML; MG/100ML; MG/100ML; MG/100ML
INJECTION, SOLUTION INTRAVENOUS CONTINUOUS
Status: DISCONTINUED | OUTPATIENT
Start: 2018-09-06 | End: 2018-09-06 | Stop reason: HOSPADM

## 2018-09-06 RX ORDER — ONDANSETRON 2 MG/ML
INJECTION INTRAMUSCULAR; INTRAVENOUS
Status: COMPLETED
Start: 2018-09-06 | End: 2018-09-06

## 2018-09-06 RX ORDER — LIDOCAINE HYDROCHLORIDE 10 MG/ML
INJECTION, SOLUTION INFILTRATION; PERINEURAL
Status: COMPLETED
Start: 2018-09-06 | End: 2018-09-06

## 2018-09-06 RX ORDER — LIDOCAINE HYDROCHLORIDE 10 MG/ML
0.5 INJECTION, SOLUTION INFILTRATION; PERINEURAL
Status: DISCONTINUED | OUTPATIENT
Start: 2018-09-06 | End: 2018-09-06 | Stop reason: HOSPADM

## 2018-09-06 RX ADMIN — FENTANYL CITRATE 50 MCG: 50 INJECTION, SOLUTION INTRAMUSCULAR; INTRAVENOUS at 14:55

## 2018-09-06 RX ADMIN — LIDOCAINE HYDROCHLORIDE 0.5 ML: 10 INJECTION, SOLUTION INFILTRATION; PERINEURAL at 10:05

## 2018-09-06 RX ADMIN — FENTANYL CITRATE 50 MCG: 50 INJECTION, SOLUTION INTRAMUSCULAR; INTRAVENOUS at 13:02

## 2018-09-06 RX ADMIN — FENTANYL CITRATE 50 MCG: 50 INJECTION, SOLUTION INTRAMUSCULAR; INTRAVENOUS at 12:43

## 2018-09-06 RX ADMIN — ONDANSETRON HYDROCHLORIDE 4 MG: 2 INJECTION, SOLUTION INTRAMUSCULAR; INTRAVENOUS at 13:45

## 2018-09-06 RX ADMIN — HYDROCODONE BITARTRATE AND ACETAMINOPHEN 30 ML: 2.5; 108 SOLUTION ORAL at 12:45

## 2018-09-06 RX ADMIN — SODIUM CHLORIDE, SODIUM LACTATE, POTASSIUM CHLORIDE, CALCIUM CHLORIDE: 600; 310; 30; 20 INJECTION, SOLUTION INTRAVENOUS at 10:05

## 2018-09-06 ASSESSMENT — PAIN SCALES - GENERAL
PAINLEVEL_OUTOF10: 5
PAINLEVEL_OUTOF10: 0
PAINLEVEL_OUTOF10: ASSUMED PAIN PRESENT
PAINLEVEL_OUTOF10: 6
PAINLEVEL_OUTOF10: 0
PAINLEVEL_OUTOF10: 5
PAINLEVEL_OUTOF10: 1
PAINLEVEL_OUTOF10: 4
PAINLEVEL_OUTOF10: 5
PAINLEVEL_OUTOF10: 1
PAINLEVEL_OUTOF10: 7

## 2018-09-06 NOTE — DISCHARGE INSTRUCTIONS
ACTIVITY: Rest and take it easy for the first 24 hours.  A responsible adult is recommended to remain with you during that time.  It is normal to feel sleepy.  We encourage you to not do anything that requires balance, judgment or coordination.    MILD FLU-LIKE SYMPTOMS ARE NORMAL. YOU MAY EXPERIENCE GENERALIZED MUSCLE ACHES, THROAT IRRITATION, HEADACHE AND/OR SOME NAUSEA.    FOR 24 HOURS DO NOT:  Drive, operate machinery or run household appliances.  Drink beer or alcoholic beverages.   Make important decisions or sign legal documents.    SPECIAL INSTRUCTIONS:  Diet: Clears advance as tolerated to regular diet.  Follow-up with Dr. Dee in 4-6 weeks.  Activity: No restrictions    Cystoscopy, Care After  Refer to this sheet in the next few weeks. These instructions provide you with information on caring for yourself after your procedure. Your caregiver may also give you more specific instructions. Your treatment has been planned according to current medical practices, but problems sometimes occur. Call your caregiver if you have any problems or questions after your procedure.  HOME CARE INSTRUCTIONS   Things you can do to ease any discomfort after your procedure include:  · Drinking enough water and fluids to keep your urine clear or pale yellow.  · Taking a warm bath to relieve any burning feelings.  SEEK IMMEDIATE MEDICAL CARE IF:   · You have an increase in blood in your urine.  · You notice blood clots in your urine.  · You have difficulty passing urine.  · You have the chills.  · You have abdominal pain.  · You have a fever or persistent symptoms for more than 2-3 days.  · You have a fever and your symptoms suddenly get worse.  MAKE SURE YOU:   · Understand these instructions.  · Will watch your condition.  · Will get help right away if you are not doing well or get worse.     This information is not intended to replace advice given to you by your health care provider. Make sure you discuss any questions you  have with your health care provider.       DIET: To avoid nausea, slowly advance diet as tolerated, avoiding spicy or greasy foods for the first day.  Add more substantial food to your diet according to your physician's instructions. INCREASE FLUIDS AND FIBER TO AVOID CONSTIPATION.    SURGICAL DRESSING/BATHING:     FOLLOW-UP APPOINTMENT:  A follow-up appointment should be arranged with your doctor in 4-6 weeks; call to schedule.    You should CALL YOUR PHYSICIAN if you develop:  Fever greater than 101 degrees F.  Pain not relieved by medication, or persistent nausea or vomiting.  Excessive bleeding (blood soaking through dressing) or unexpected drainage from the wound.  Extreme redness or swelling around the incision site, drainage of pus or foul smelling drainage.  Inability to urinate or empty your bladder within 8 hours.  Problems with breathing or chest pain.    You should call 911 if you develop problems with breathing or chest pain.  If you are unable to contact your doctor or surgical center, you should go to the nearest emergency room or urgent care center.  Physician's telephone #: Dr. Dee [phone]    If any questions arise, call your doctor.  If your doctor is not available, please feel free to call the Surgical Center at [phone].  The Center is open Monday through Friday from 7AM to 7PM.  You can also call the [facility] HOTLINE open 24 hours/day, 7 days/week and speak to a nurse at [phone], or toll free at [phone].    A registered nurse may call you a few days after your surgery to see how you are doing after your procedure.    MEDICATIONS: Resume taking daily medication.  Take prescribed pain medication with food.  If no medication is prescribed, you may take non-aspirin pain medication if needed.  PAIN MEDICATION CAN BE VERY CONSTIPATING.  Take a stool softener or laxative such as senokot, pericolace, or milk of magnesia if needed.    No prescriptions given.  Last pain,  HYDROCODONE,  medication given at 12:45 pm. May take ibuprofen any time.     If your physician has prescribed pain medication that includes Acetaminophen (Tylenol), do not take additional Acetaminophen (Tylenol) while taking the prescribed medication.    Depression / Suicide Risk    As you are discharged from this [facility] facility, it is important to learn how to keep safe from harming yourself.    Recognize the warning signs:  · Abrupt changes in personality, positive or negative- including increase in energy   · Giving away possessions  · Change in eating patterns- significant weight changes-  positive or negative  · Change in sleeping patterns- unable to sleep or sleeping all the time   · Unwillingness or inability to communicate  · Depression  · Unusual sadness, discouragement and loneliness  · Talk of wanting to die  · Neglect of personal appearance   · Rebelliousness- reckless behavior  · Withdrawal from people/activities they love  · Confusion- inability to concentrate     If you or a loved one observes any of these behaviors or has concerns about self-harm, here's what you can do:  · Talk about it- your feelings and reasons for harming yourself  · Remove any means that you might use to hurt yourself (examples: pills, rope, extension cords, firearm)  · Get professional help from the community (Mental Health, Substance Abuse, psychological counseling)  · Do not be alone:Call your Safe Contact- someone whom you trust who will be there for you.  · Call your local CRISIS HOTLINE 410-2732 or 756-270-2026  · Call your local Children's Mobile Crisis Response Team [place] (426) 015-6942 or www.ASP64  · Call the toll free National Suicide Prevention Hotlines   · National Suicide Prevention Lifeline 945-143-EKJY (8842)  · National Hope Line Network 800-SUICIDE (923-6538)

## 2018-09-06 NOTE — OP REPORT
DATE OF SERVICE:  09/06/2018    PREOPERATIVE DIAGNOSES:  1.  History of left obstructing pyonephrosis.  2.  Left indwelling left stent.  3.  Left distal ureteral stone.    POSTOPERATIVE DIAGNOSES:  1.  History of left obstructing pyonephrosis.  2.  Left indwelling left stent.  3.  Left distal ureteral stone.    PROCEDURES PERFORMED:  1.  Rigid cystourethroscopy with removal of left stent.  2.  Left flexible ureteroscopy with holmium laser lithotripsy of left distal   ureteral stone.    SURGEON:  Cr Dee MD    ANESTHESIA:  General laryngeal mask.    ANESTHESIOLOGIST:  Rafael Villegas MD    COMPLICATIONS:  None.    DRAINS:  None.    INDICATIONS:  The patient is a 32-year-old gentleman with a history of fever,   chills, urinary tract infection and a distal ureteral stone with findings   consistent with urosepsis.  He was taken to the operating room several weeks   ago for cystoscopy and stent placement with intravenous antibiotics,   transitioning oral antibiotics, he has improved.  He has been counseled as to   treatment options and recommendation for cystoscopy, stent removal,   ureteroscopy with possible stent placement.  Prior to surgery, he is aware   that the risks of the procedure including but not limited to risk of   perioperative urinary tract infection, risk of urosepsis, risk of ureteral   perforation, potential need for secondary procedures.  He is also aware of the   potential risk of associated stent migration if this one repositioned and he   is aware of the perioperative risk of flank pain from passage of blood clot   and stone debris if a stent is not positioned.  I have also discussed   perioperative risk of deep vein thrombosis, pulmonary embolism, aspiration   pneumonia and death.  Informed consent was given to me by the patient to   proceed.  He was marked in the preoperative holding area on his left thigh   with my initials and a letter Y.    OPERATION IN DETAIL:  After informed consent was  obtained, the patient was   brought to the operating room and placed supine.  Bilateral sequential   compression devices were placed and activated.  A general laryngeal mask   anesthetic administered in a balanced fashion.  He received intravenous Ancef   and he was positioned in modified lithotomy where the operative area was   Betadine prepped and draped in the usual sterile fashion.    A surgical time-out was called.  All members of the operative team agree as to   the patient's name, procedure to be performed and the fact his left side and   a CT scan was available for review confirming a left stone.  Attention was   directed towards passing a generously lubricated 22-Belarusian rigid cystoscope   with 30-degree lens per urethra.  The anterior urethra was normal.  The   prostatic fossa measured 3.5 cm in length with subocclusive hypertrophy of the   prostate.  Upon entering the bladder, serial evaluation showed some blood in   the bladder, there is a small clot, which was removed.  There is a stent   emanating from the left orifice with mild erythema.  On the right side, the   ureteral orifice was orthotopic, had clear efflux.  The stent was grasped.    The ureteral orifice is widely patent and is removed and then I pushed a   safety wire up into the kidney as documented on fluoroscopy.  At this point in   time, over the wire, I passed a digital flexible ureteroscope up to the level   of the stone and with hydrodistention, withdrew the wire, passed a 200 micron   laser fiber in a frequency of 5 Hz and 1000 millijoules fragmented the stone.    I started at 400 millijoules, but the outer part of the stone was soft, the   inner part was quite hard and had to increase the energy to 1000.  After   completely treating the stone and much of the debris passing, I passed the   scope up into the kidney where the patient is noted to have no residual   stones.  The proximal ureter is normal.  The mid ureter is normal and the    distal ureter showed some edema where the stone was treated, but there is no   obstruction, no residual stones.  I subsequently put the laser in standby   mode, withdrew the wire and with excellent hydrodistention was able to   evaluate the entire ureter.  I withdrew the scope and the ureteral orifice was   widely patent and I was able to advance the scope without a safety wire as   there was a patulous ureter and elected not to leave a stent at the end of the   case.  The bladder was drained.  He tolerated the procedure well and was   awakened in the operating room and transferred to recovery room where he   arrived in stable condition.       ____________________________________     MD ERROL DALE / ROBERT    DD:  09/06/2018 12:28:43  DT:  09/06/2018 13:08:58    D#:  [id number]  Job#:  [id number]

## 2018-09-06 NOTE — OR NURSING
Pt stood to get dressed, reported dizziness and then vomited approx 200ml clear, light yellow fluid. Pt was assisted back to bed by wife and RN. Nausea and dizziness present. Zofran was given, see MAR. Cool wash cloth given. Wife remains at bedside and pt resting in bed, rates pain 5/10 in groin, lower abd, declines need for intervention with medication. Reports keeping HOB at 30 or less helps. Pt reports rest will help his pain and nausea and pt closed eyes to nap. O2 sats dropped to 87-88% on RA while resting, O2 placed on at 2L per NC for rest.VS remain stable.

## 2018-09-06 NOTE — OR NURSING
Discharge instructions reviewed with wife, Loretta, who v/u of them. Pt resting with eyes closed and appears comfortable at this time.

## 2018-09-06 NOTE — OR NURSING
Pt ambulated to bathroom down the hallway and back. Tolerated well. Denied nausea and dizziness. Voided and returned to room. Reports feels ready to go now. IV removed for discharge. Pain 1/10.

## 2018-09-06 NOTE — OR NURSING
Pt woke from nap. Rated pain in left mid back 7/10, sharp and radiating down. Requested pain medication. Fentanyl given. See MAR.

## 2018-09-06 NOTE — OR NURSING
"Pt arrived to phase 2. VSS. See doc flowsheet. Pt A&Ox4. Pt and wife educated on POC for phase 2 and discharge. Pain 5/10 at this time and pt states its \"fine.\"   "

## 2018-09-06 NOTE — OR SURGEON
Immediate Post OP Note    PreOp Diagnosis: Left Distal Ureteral Stone                                Left Indwelling Stent    PostOp Diagnosis: As above    Procedure(s):  CYSTOSCOPY W/STENT REMOVAL- Wound Class: Clean Contaminated  LEFT FLEXIBLE URETEROSCOPY WITH LASER LITHOTRIPSY OF DISTAL URETERAL STONE Wound Class: Clean Contaminated    Surgeon(s):  Cr Dee M.D.    Anesthesiologist/Type of Anesthesia:  Anesthesiologist: Rafael Villegas M.D./General LMA  Surgical Staff:  Circulator: Sally Sethi  Laser Staff: Pancho Hernandez Circulator: Pa Stauffer R.N.  Scrub Person: Rolf Garg    Specimens removed if any:  None  Estimated Blood Loss: N/A    Findings: Distal ureteral stone    Complications: None  Drains: None        9/6/2018 12:20 PM Cr Dee M.D.

## 2018-09-06 NOTE — OR NURSING
Pt discharged per /. Pt had emesis while riding in [place] and down elevator, reports the motion seemed to make him feel sick. Emesis bags given. Unit phone number and doctor's number given in case this continues upon return to home.

## 2018-09-06 NOTE — OR NURSING
POSTOP CYSTOSCOPY AND LEFT STENT REMOVAL    PT ARRIVED WITH LMA/4L NC, EXTERIOR SURGICAL SITE VISIBLE, LEFT STENT REMOVED, NO BUILD UP ON STENT OR CLOUDY URINE DURING PROCEDURE. PT SLEEPING WITHOUT PAIN BEHAVIORS. ONCE PT WOKE UP MEDICATED WITH BOTH IV AND PO PRN PAIN MEDICATION, BEGAN PO FLUIDS WITHOUT NAUSEA.    PT TOLERATED PO FLUIDS AND SNACK WITHOUT NAUSEA, ABLE TO WEAN OFF OXYGEN TO RA, A & O X 4, PAIN RELIEF GOAL 4/10 AND WAS ABLE TO ACHIEVE THIS GOAL IN PHASE I. FAMILY UPDATED, DISCHARGE INSTRUCTIONS IN CHART,REPORT CALLED TO CATHY, CHART TAKEN, FAMILY AWARE OF POC TODAY.

## 2018-09-07 LAB
BACTERIA UR CULT: NORMAL
SIGNIFICANT IND 70042: NORMAL
SITE SITE: NORMAL
SOURCE SOURCE: NORMAL

## 2021-04-22 ENCOUNTER — OFFICE VISIT (OUTPATIENT)
Dept: URGENT CARE | Facility: PHYSICIAN GROUP | Age: 36
End: 2021-04-22
Payer: COMMERCIAL

## 2021-04-22 VITALS
DIASTOLIC BLOOD PRESSURE: 80 MMHG | OXYGEN SATURATION: 97 % | RESPIRATION RATE: 18 BRPM | WEIGHT: 257 LBS | HEIGHT: 73 IN | BODY MASS INDEX: 34.06 KG/M2 | SYSTOLIC BLOOD PRESSURE: 144 MMHG | TEMPERATURE: 98.6 F | HEART RATE: 86 BPM

## 2021-04-22 DIAGNOSIS — R10.32 COLICKY LLQ ABDOMINAL PAIN: ICD-10-CM

## 2021-04-22 DIAGNOSIS — N20.1 LEFT URETERAL STONE: ICD-10-CM

## 2021-04-22 DIAGNOSIS — Z87.442 HISTORY OF KIDNEY STONES: ICD-10-CM

## 2021-04-22 DIAGNOSIS — R10.9 LEFT FLANK PAIN: ICD-10-CM

## 2021-04-22 DIAGNOSIS — N13.30 HYDROURETERONEPHROSIS: ICD-10-CM

## 2021-04-22 LAB
APPEARANCE UR: CLEAR
BILIRUB UR STRIP-MCNC: NEGATIVE MG/DL
COLOR UR AUTO: YELLOW
GLUCOSE UR STRIP.AUTO-MCNC: NEGATIVE MG/DL
KETONES UR STRIP.AUTO-MCNC: NEGATIVE MG/DL
LEUKOCYTE ESTERASE UR QL STRIP.AUTO: NEGATIVE
NITRITE UR QL STRIP.AUTO: NEGATIVE
PH UR STRIP.AUTO: 6 [PH] (ref 5–8)
PROT UR QL STRIP: NEGATIVE MG/DL
RBC UR QL AUTO: NORMAL
SP GR UR STRIP.AUTO: 1.02
UROBILINOGEN UR STRIP-MCNC: 1 MG/DL

## 2021-04-22 PROCEDURE — 99214 OFFICE O/P EST MOD 30 MIN: CPT | Performed by: PHYSICIAN ASSISTANT

## 2021-04-22 PROCEDURE — 81002 URINALYSIS NONAUTO W/O SCOPE: CPT | Performed by: PHYSICIAN ASSISTANT

## 2021-04-22 RX ORDER — TAMSULOSIN HYDROCHLORIDE 0.4 MG/1
0.4 CAPSULE ORAL
Qty: 30 CAPSULE | Refills: 0 | Status: SHIPPED | OUTPATIENT
Start: 2021-04-22 | End: 2021-04-22

## 2021-04-22 RX ORDER — TRAMADOL HYDROCHLORIDE 50 MG/1
50 TABLET ORAL
Qty: 6 TABLET | Refills: 0 | Status: SHIPPED | OUTPATIENT
Start: 2021-04-22 | End: 2021-04-22

## 2021-04-22 RX ORDER — TAMSULOSIN HYDROCHLORIDE 0.4 MG/1
0.4 CAPSULE ORAL
Qty: 30 CAPSULE | Refills: 0 | Status: SHIPPED | OUTPATIENT
Start: 2021-04-22 | End: 2021-11-29

## 2021-04-22 RX ORDER — TRAMADOL HYDROCHLORIDE 50 MG/1
50 TABLET ORAL
Qty: 6 TABLET | Refills: 0 | Status: SHIPPED | OUTPATIENT
Start: 2021-04-22 | End: 2021-04-28

## 2021-04-23 ENCOUNTER — HOSPITAL ENCOUNTER (OUTPATIENT)
Dept: RADIOLOGY | Facility: MEDICAL CENTER | Age: 36
End: 2021-04-23
Attending: PHYSICIAN ASSISTANT
Payer: COMMERCIAL

## 2021-04-23 DIAGNOSIS — Z87.442 HISTORY OF KIDNEY STONES: ICD-10-CM

## 2021-04-23 DIAGNOSIS — R10.9 LEFT FLANK PAIN: ICD-10-CM

## 2021-04-23 DIAGNOSIS — R10.32 COLICKY LLQ ABDOMINAL PAIN: ICD-10-CM

## 2021-04-23 PROCEDURE — 74176 CT ABD & PELVIS W/O CONTRAST: CPT

## 2021-04-23 ASSESSMENT — ENCOUNTER SYMPTOMS
FEVER: 0
VOMITING: 0
BLURRED VISION: 0
CONSTIPATION: 0
NAUSEA: 1
PALPITATIONS: 0
DIZZINESS: 0
SHORTNESS OF BREATH: 0
FLANK PAIN: 1
CHILLS: 0
DIARRHEA: 0
ABDOMINAL PAIN: 1
BLOOD IN STOOL: 0

## 2021-04-23 NOTE — PROGRESS NOTES
Subjective:      Erik Sandoval is a 35 y.o. male who presents with Low Back Pain (sharp pain on left side of low back and abdomin. 2 weeks now. had kidney stones before and he is feeling that same way. )      HPI:  Erik Sandoval is a 35 y.o. male who presents today for evaluation of abdominal pain.  Patient reports that he had sudden onset of left lower quadrant abdominal pain that started approximately 2 weeks ago.  He says that the pain is also located in the left flank.  He states that the pain is fairly constant but he gets waves of sharp stabbing pain.  When he gets this sharp pain he has some associated nausea.  He has not had any vomiting.  No diarrhea.  No fever.  He has noticed some blood in the urine and burning with urination.  He has history of kidney stones in the past and they are always on the left side.  He was hospitalized in 2018 for urosepsis secondary to obstructive stone.  Patient reports that he has been taking ibuprofen for the symptoms over the past couple weeks but it is not managing it anymore.      Review of Systems   Constitutional: Negative for chills, fever and malaise/fatigue.   Eyes: Negative for blurred vision.   Respiratory: Negative for shortness of breath.    Cardiovascular: Negative for chest pain and palpitations.   Gastrointestinal: Positive for abdominal pain and nausea. Negative for blood in stool, constipation, diarrhea, melena and vomiting.   Genitourinary: Positive for dysuria, flank pain and hematuria. Negative for frequency and urgency.   Neurological: Negative for dizziness.       PMH:  has a past medical history of Renal disorder and Snoring.  MEDS:   Current Outpatient Medications:   •  tamsulosin (FLOMAX) 0.4 MG capsule, Take 1 capsule by mouth 1/2 hour after breakfast., Disp: 30 capsule, Rfl: 0  •  traMADol (ULTRAM) 50 MG Tab, Take 1 tablet by mouth at bedtime as needed for Moderate Pain or Severe Pain for up to 6 days., Disp: 6 tablet, Rfl: 0  •  ibuprofen  "(MOTRIN) 800 MG Tab, Take 1 Tab by mouth every 6 hours as needed., Disp: 90 Tab, Rfl: 0  ALLERGIES:   Allergies   Allergen Reactions   • Oxycodone Nausea     Even with anti nausea med. Does not like feeling     SURGHX:   Past Surgical History:   Procedure Laterality Date   • CYSTOSCOPY STENT PLACEMENT N/A 9/6/2018    Procedure: CYSTOSCOPY STENT PLACEMENT - RIGID W/STENT REMOVAL AND POSS STENT REPLACEMENT;  Surgeon: Cr Dee M.D.;  Location: SURGERY [facility];  Service: Urology   • URETEROSCOPY Left 9/6/2018    Procedure: URETEROSCOPY - FLEXIBLE;  Surgeon: Cr Dee M.D.;  Location: SURGERY [facility];  Service: Urology   • LASERTRIPSY Left 9/6/2018    Procedure: LASERTRIPSY - LITHO ;  Surgeon: Cr Dee M.D.;  Location: SURGERY [facility];  Service: Urology   • CYSTOSCOPY STENT PLACEMENT Left 8/19/2018    Procedure: CYSTOSCOPY STENT PLACEMENT;  Surgeon: Cr Dee M.D.;  Location: SURGERY [facility];  Service: Urology   • NASAL FRACTURE REDUCTION CLOSED       SOCHX:  reports that he has never smoked. He has never used smokeless tobacco. He reports that he does not drink alcohol and does not use drugs.  FH: Family history was reviewed, no pertinent findings to report     Objective:     /80   Pulse 86   Temp 37 °C (98.6 °F) (Temporal)   Resp 18   Ht 1.854 m (6' 1\")   Wt 117 kg (257 lb)   SpO2 97%   BMI 33.91 kg/m²      Physical Exam  Constitutional:       Appearance: Normal appearance. He is well-developed.   HENT:      Head: Normocephalic and atraumatic.      Right Ear: External ear normal.      Left Ear: External ear normal.   Eyes:      Conjunctiva/sclera: Conjunctivae normal.      Pupils: Pupils are equal, round, and reactive to light.   Cardiovascular:      Rate and Rhythm: Normal rate and regular rhythm.      Heart sounds: No murmur.   Pulmonary:      Effort: Pulmonary effort is normal.      Breath sounds: Normal breath sounds.   Abdominal:      Palpations: " Abdomen is soft.      Tenderness: There is abdominal tenderness in the suprapubic area and left lower quadrant. There is left CVA tenderness. There is no right CVA tenderness, guarding or rebound.   Skin:     General: Skin is warm and dry.      Capillary Refill: Capillary refill takes less than 2 seconds.   Neurological:      Mental Status: He is alert and oriented to person, place, and time.   Psychiatric:         Behavior: Behavior normal.         Judgment: Judgment normal.         POCT Urinalysis  Lab Results   Component Value Date/Time    POCCOLOR Yellow 04/22/2021 07:06 PM    POCAPPEAR Clear 04/22/2021 07:06 PM    POCLEUKEST Negative 04/22/2021 07:06 PM    POCNITRITE Negative 04/22/2021 07:06 PM    POCUROBILIGE 1.0 04/22/2021 07:06 PM    POCPROTEIN Negative 04/22/2021 07:06 PM    POCURPH 6.0 04/22/2021 07:06 PM    POCBLOOD Trace 04/22/2021 07:06 PM    POCSPGRV 1.025 04/22/2021 07:06 PM    POCKETONES Negative 04/22/2021 07:06 PM    POCBILIRUBIN Negative 04/22/2021 07:06 PM    POCGLUCUA Negative 04/22/2021 07:06 PM        CT-RENAL COLIC EVALUATION(A/P W/O)  IMPRESSION:     Left distal ureteral 7 mm calculus causes moderate hydroureteronephrosis and mild fat stranding     Bilateral nephroliths measure up to 6 mm on the left, 5 mm on the right     Improving hepatic steatosis             Assessment/Plan:     1. Colicky LLQ abdominal pain  - tamsulosin (FLOMAX) 0.4 MG capsule; Take 1 capsule by mouth 1/2 hour after breakfast.  Dispense: 30 capsule; Refill: 0  - traMADol (ULTRAM) 50 MG Tab; Take 1 tablet by mouth at bedtime as needed for Moderate Pain or Severe Pain for up to 6 days.  Dispense: 6 tablet; Refill: 0  - POCT Urinalysis  - CT-RENAL COLIC EVALUATION(A/P W/O); Future    2. Left flank pain  - tamsulosin (FLOMAX) 0.4 MG capsule; Take 1 capsule by mouth 1/2 hour after breakfast.  Dispense: 30 capsule; Refill: 0  - traMADol (ULTRAM) 50 MG Tab; Take 1 tablet by mouth at bedtime as needed for Moderate Pain or  Severe Pain for up to 6 days.  Dispense: 6 tablet; Refill: 0  - POCT Urinalysis  - CT-RENAL COLIC EVALUATION(A/P W/O); Future    3. History of kidney stones  - tamsulosin (FLOMAX) 0.4 MG capsule; Take 1 capsule by mouth 1/2 hour after breakfast.  Dispense: 30 capsule; Refill: 0  - traMADol (ULTRAM) 50 MG Tab; Take 1 tablet by mouth at bedtime as needed for Moderate Pain or Severe Pain for up to 6 days.  Dispense: 6 tablet; Refill: 0  - POCT Urinalysis  - CT-RENAL COLIC EVALUATION(A/P W/O); Future        Addendum 3:45 PM on 4/23/2021.  Discussed patient's CT scan results with him.  Discussed that there is a obstructing stone on the left side causing a moderate amount of hydroureteronephrosis.  Patient reports that he actually feels little bit improved since yesterday.  He was able to sleep last night after taking the tramadol.  He continues not to have any vomiting, fever, or worsening pain.  No difficulty urinating.  Patient has been able to do his daily activities without issue.  Discussed with patient that since he is doing so well we will put in an urgent referral to urology and have him follow-up with them next week.  If his symptoms worsen at all, however, he should go to the emergency department for more prompt evaluation management.  Specific symptoms we discussed that would prompt him to go to the ER would be fever, vomiting, or severely worsening abdominal pain or flank pain.

## 2021-05-10 ENCOUNTER — HOSPITAL ENCOUNTER (OUTPATIENT)
Dept: LAB | Facility: MEDICAL CENTER | Age: 36
End: 2021-05-10
Attending: ANESTHESIOLOGY
Payer: COMMERCIAL

## 2021-05-10 LAB
COVID ORDER STATUS COVID19: NORMAL
SARS-COV-2 RNA RESP QL NAA+PROBE: NOTDETECTED
SPECIMEN SOURCE: NORMAL

## 2021-05-10 PROCEDURE — C9803 HOPD COVID-19 SPEC COLLECT: HCPCS

## 2021-05-10 PROCEDURE — U0005 INFEC AGEN DETEC AMPLI PROBE: HCPCS

## 2021-05-10 PROCEDURE — U0003 INFECTIOUS AGENT DETECTION BY NUCLEIC ACID (DNA OR RNA); SEVERE ACUTE RESPIRATORY SYNDROME CORONAVIRUS 2 (SARS-COV-2) (CORONAVIRUS DISEASE [COVID-19]), AMPLIFIED PROBE TECHNIQUE, MAKING USE OF HIGH THROUGHPUT TECHNOLOGIES AS DESCRIBED BY CMS-2020-01-R: HCPCS

## 2021-05-14 ENCOUNTER — PATIENT OUTREACH (OUTPATIENT)
Dept: SCHEDULING | Facility: IMAGING CENTER | Age: 36
End: 2021-05-14

## 2021-11-29 ENCOUNTER — OFFICE VISIT (OUTPATIENT)
Dept: MEDICAL GROUP | Facility: CLINIC | Age: 36
End: 2021-11-29
Payer: COMMERCIAL

## 2021-11-29 VITALS
DIASTOLIC BLOOD PRESSURE: 74 MMHG | HEIGHT: 73 IN | HEART RATE: 79 BPM | OXYGEN SATURATION: 95 % | TEMPERATURE: 98 F | SYSTOLIC BLOOD PRESSURE: 104 MMHG | RESPIRATION RATE: 12 BRPM | WEIGHT: 256 LBS | BODY MASS INDEX: 33.93 KG/M2

## 2021-11-29 DIAGNOSIS — G47.33 OSA (OBSTRUCTIVE SLEEP APNEA): ICD-10-CM

## 2021-11-29 DIAGNOSIS — R25.1 TREMOR OF BOTH HANDS: ICD-10-CM

## 2021-11-29 DIAGNOSIS — R07.89 OTHER CHEST PAIN: ICD-10-CM

## 2021-11-29 DIAGNOSIS — E66.09 CLASS 1 OBESITY DUE TO EXCESS CALORIES WITHOUT SERIOUS COMORBIDITY WITH BODY MASS INDEX (BMI) OF 34.0 TO 34.9 IN ADULT: ICD-10-CM

## 2021-11-29 PROCEDURE — 99213 OFFICE O/P EST LOW 20 MIN: CPT | Mod: GE | Performed by: STUDENT IN AN ORGANIZED HEALTH CARE EDUCATION/TRAINING PROGRAM

## 2021-11-29 RX ORDER — OMEPRAZOLE 20 MG/1
20 CAPSULE, DELAYED RELEASE ORAL DAILY
Qty: 90 CAPSULE | Refills: 3 | Status: SHIPPED | OUTPATIENT
Start: 2021-11-29 | End: 2022-01-31

## 2021-11-29 ASSESSMENT — PATIENT HEALTH QUESTIONNAIRE - PHQ9: CLINICAL INTERPRETATION OF PHQ2 SCORE: 0

## 2021-11-29 NOTE — ASSESSMENT & PLAN NOTE
Bilateral hand tremor most likely essential tremor.  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

## 2021-11-29 NOTE — ASSESSMENT & PLAN NOTE
High risk of KENNETH based on symptoms and clinical history.  Will likely benefit from CPAP.  -Sleep study referral sent

## 2021-11-29 NOTE — ASSESSMENT & PLAN NOTE
Chest pain diagnosis unclear, consider costochondritis, pleuritic pain, GERD, anxiety. Based on clinical history, unremarkable EKG, and HEART score of 1, very unlikely to be ACS, however cannot be excluded.  -Return to clinic when possible for stress test  -Trial omeprazole for possible GERD  -ER precautions given.

## 2021-11-29 NOTE — PROGRESS NOTES
"[facility]    Chief Complaint   Patient presents with   • Establish Care       HISTORY OF PRESENT ILLNESS: Patient is a 36 y.o. male established patient who presents today to discuss the medical issues below:    Problem   Tremor of Both Hands    Patient doesn't notice, but others notice that his hands are \"shaky\". Has been happening since high school. Occurs particularly with using his hands, but occasionally happen at rest. Patient works as a .  -No family history of parkinson's or Essential Tremor     Mesfin (Obstructive Sleep Apnea)    Stop bang score of 5, which places patient at high risk of MESFIN.  Symptoms include loud snoring at night, daytime sleepiness, observed apnea, elevated BMI, and large neck circumference.    Patient reports minimal caffeine intake.     Obesity    BMI 33.78     Other Chest Pain    1 month of intermittent chest pain. No association with physical activity. No association with eating. Pain is central anterior chest, described as heaviness and aching. No difficulty breathing during these episodes. Some pain with deep breaths when this happens. And sometimes has a headache.  -Pain will last 1-2 hours, but can last for up to 6 hours.  -No relieving factors  -Doesn't take meds for it  -No associated dizziness, palpitations          Patient Active Problem List    Diagnosis Date Noted   • Tremor of both hands 11/29/2021   • Obesity 08/18/2018   • Other chest pain 08/18/2018       Allergies:Oxycodone    No current outpatient medications on file.     No current facility-administered medications for this visit.         Past Medical History:   Diagnosis Date   • Kidney stones    • Renal disorder     kidney stones   • Snoring     no sleep study       Social History     Tobacco Use   • Smoking status: Never Smoker   • Smokeless tobacco: Never Used   Substance Use Topics   • Alcohol use: No   • Drug use: No       Family Status   Relation Name Status   • Fa  (Not Specified)   • MGMo  (Not " "Specified)   • MGFa  (Not Specified)   • PGMo  (Not Specified)   • PGFa  (Not Specified)     Family History   Problem Relation Age of Onset   • Stroke Father    • Diabetes Maternal Grandmother    • Diabetes Maternal Grandfather    • Diabetes Paternal Grandmother    • Diabetes Paternal Grandfather        ROS:  Negative except as stated above.      Exam:   /74 (BP Location: Right arm, Patient Position: Sitting, BP Cuff Size: Adult)   Pulse 79   Temp 36.7 °C (98 °F)   Resp 12   Ht 1.854 m (6' 1\")   Wt 116 kg (256 lb)   SpO2 95%  Body mass index is 33.78 kg/m².  General:  Well nourished, well developed male in NAD.  HENT: Normocephalic  Pulmonary: Clear to ausculation.  Normal effort. No rales, rhonchi, or wheezing.  Cardiovascular: Regular rate and rhythm without murmur.   Abdomen: Normal bowel sounds, soft and nontender, no palpable liver, spleen, or masses.  Extremities: No LE edema noted. 5/5 strength in all extremities. Mild tremor in bilateral hands.  Neuro: Slight wander of finger with \"finger to nose to finger\" test.  Skin: No lesions, erythema, or other abnormalities noted.  Psych: Alert and oriented to person, place, and time. Appropriate mood and conversation.    Assessment/Plan:    Other chest pain  Chest pain diagnosis unclear, consider costochondritis, pleuritic pain, GERD, anxiety. Based on clinical history, unremarkable EKG, and HEART score of 1, very unlikely to be ACS, however cannot be excluded.  -Return to clinic when possible for stress test  -Trial omeprazole for possible GERD  -ER precautions given.    Tremor of both hands  Bilateral hand tremor most likely essential tremor.  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    Obesity  No known workup in the past for dysplipidemia or DM  -TSH, Lipid panel, CMP    KENNETH (obstructive sleep apnea)  High risk of KENNETH based on symptoms and clinical history.  Will likely benefit " from CPAP.  -Sleep study referral sent          DO DEEPA Alanis   PGY-2

## 2022-01-31 ENCOUNTER — OFFICE VISIT (OUTPATIENT)
Dept: SLEEP MEDICINE | Facility: MEDICAL CENTER | Age: 37
End: 2022-01-31
Payer: COMMERCIAL

## 2022-01-31 VITALS
RESPIRATION RATE: 14 BRPM | BODY MASS INDEX: 35.25 KG/M2 | DIASTOLIC BLOOD PRESSURE: 80 MMHG | HEIGHT: 73 IN | OXYGEN SATURATION: 94 % | HEART RATE: 97 BPM | WEIGHT: 266 LBS | SYSTOLIC BLOOD PRESSURE: 130 MMHG

## 2022-01-31 DIAGNOSIS — G47.30 SLEEP DISORDER BREATHING: Primary | ICD-10-CM

## 2022-01-31 DIAGNOSIS — R06.83 LOUD SNORING: ICD-10-CM

## 2022-01-31 PROCEDURE — 99203 OFFICE O/P NEW LOW 30 MIN: CPT | Performed by: STUDENT IN AN ORGANIZED HEALTH CARE EDUCATION/TRAINING PROGRAM

## 2022-01-31 ASSESSMENT — PATIENT HEALTH QUESTIONNAIRE - PHQ9: CLINICAL INTERPRETATION OF PHQ2 SCORE: 0

## 2022-01-31 NOTE — PROGRESS NOTES
[facility] Consult Note     Date: 1/31/2022 / Time: 12:32 PM      Thank you for requesting a sleep medicine consultation on Erik Sandoval at the sleep center. Presents today with the chief complaints of snoring, pauses in breathing during sleep, occasional excessive daytime sleepiness. [place] [phone] for evaluation and treatment of sleep disorder breathing.     HISTORY OF PRESENT ILLNESS:     Erik Sandoval is a 36 y.o. male with obesity.  Presents to Sleep Clinic for evaluation of sleep apnea.    He has been told that he has snored very loudly for years.  More recently his wife has been concerned because he does have gasping episodes as well as pauses in breathing during sleep.  She has even woken him up due to him not breathing.  He overall feels his sleep is well.  He has no concerns current regarding sleep as he feels rested when he wakes up.  He is not overly tired during the day.  He does report if he is not staying busy she will get tired and sometimes take a nap while watching TV.  He does wake up 2-3 times a night for unknown reasons but is able to get back to sleep without issue.    As per supplemental questionnaire to be scanned or imported into chart:    [place] Sleepiness Score: 0    Sleep Schedule  Bedtime: Weekday 830-9pm Weekend 9pm  Wake time: Weekday 230am Weekend 630am  Sleep-onset latency: 5min   Awakenings from sleep: 2-3 times   Difficulty falling back asleep: none   Bedroom partner: yes   Naps: No     DAYTIME SYMPTOMS:   Excessive daytime sleepiness: No   Daytime fatigue: No   Difficulty concentrating: No   Memory problems: No   Irritability:No   Work/school performance issues: No   Sleepiness with driving: No   Caffeine/stimulant use: No   Alcohol use:No     SLEEP RELATED SYMPTOMS  Snoring: Yes  Witnessed apnea or gasping/choking: Yes  Dry mouth or mouth breathing: Yes  Sweating: Yes  Teeth grinding/biting:  "No   Morning headaches: No   Refreshed Upon Awakening: Yes     SLEEP RELATED BEHAVIORS:  Parasomnias (walking, talking, eating, violence): No   Leg kicking: Yes  Restless legs - \"urge to move\": No   Nightmares: No  Recurrent: No   Dream enactment: No      NARCOLEPSY:  Cataplexy: No   Sleep paralysis: No   Sleep attacks: No   Hypnagogic/hypnopompic hallucinations: No     MEDICAL HISTORY  Past Medical History:   Diagnosis Date   • Kidney stones    • Renal disorder     kidney stones   • Snoring     no sleep study        SURGICAL HISTORY  Past Surgical History:   Procedure Laterality Date   • CYSTOSCOPY STENT PLACEMENT N/A 9/6/2018    Procedure: CYSTOSCOPY STENT PLACEMENT - RIGID W/STENT REMOVAL AND POSS STENT REPLACEMENT;  Surgeon: Cr Dee M.D.;  Location: SURGERY [facility];  Service: Urology   • URETEROSCOPY Left 9/6/2018    Procedure: URETEROSCOPY - FLEXIBLE;  Surgeon: Cr Dee M.D.;  Location: SURGERY [facility];  Service: Urology   • LASERTRIPSY Left 9/6/2018    Procedure: LASERTRIPSY - LITHO ;  Surgeon: Cr Dee M.D.;  Location: SURGERY [facility];  Service: Urology   • CYSTOSCOPY STENT PLACEMENT Left 8/19/2018    Procedure: CYSTOSCOPY STENT PLACEMENT;  Surgeon: Cr Dee M.D.;  Location: SURGERY [facility];  Service: Urology   • NASAL FRACTURE REDUCTION CLOSED          FAMILY HISTORY  Family History   Problem Relation Age of Onset   • Stroke Father    • Diabetes Maternal Grandmother    • Diabetes Maternal Grandfather    • Diabetes Paternal Grandmother    • Diabetes Paternal Grandfather        SOCIAL HISTORY  Social History     Socioeconomic History   • Marital status:      Spouse name: Not on file   • Number of children: Not on file   • Years of education: Not on file   • Highest education level: Not on file   Occupational History   • Not on file   Tobacco Use   • Smoking status: Never Smoker   • Smokeless tobacco: Never Used   Substance and Sexual Activity   • " Alcohol use: No   • Drug use: No   • Sexual activity: Yes     Partners: Female   Other Topics Concern   • Not on file   Social History Narrative   • Not on file     Social Determinants of Health     Financial Resource Strain:    • Difficulty of Paying Living Expenses: Not on file   Food Insecurity:    • Worried About Running Out of Food in the Last Year: Not on file   • Ran Out of Food in the Last Year: Not on file   Transportation Needs:    • Lack of Transportation (Medical): Not on file   • Lack of Transportation (Non-Medical): Not on file   Physical Activity:    • Days of Exercise per Week: Not on file   • Minutes of Exercise per Session: Not on file   Stress:    • Feeling of Stress : Not on file   Social Connections:    • Frequency of Communication with Friends and Family: Not on file   • Frequency of Social Gatherings with Friends and Family: Not on file   • Attends Zoroastrianism Services: Not on file   • Active Member of Clubs or Organizations: Not on file   • Attends Club or Organization Meetings: Not on file   • Marital Status: Not on file   Intimate Partner Violence:    • Fear of Current or Ex-Partner: Not on file   • Emotionally Abused: Not on file   • Physically Abused: Not on file   • Sexually Abused: Not on file   Housing Stability:    • Unable to Pay for Housing in the Last Year: Not on file   • Number of Places Lived in the Last Year: Not on file   • Unstable Housing in the Last Year: Not on file        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     No current facility-administered medications for this visit.       REVIEW OF SYSTEMS  Constitutional: Denies fevers, Denies weight changes  Ears/Nose/Throat/Mouth: Denies nasal congestion or sore throat   Cardiovascular: Denies chest pain  Respiratory: Denies shortness of breath, Denies cough  Gastrointestinal/Hepatic: Denies nausea, vomiting  Sleep: see HPI    Physical  "Examination:  Vitals/ General Appearance:   Weight/BMI: Body mass index is 35.09 kg/m².  /80 (BP Location: Left arm, Patient Position: Sitting, BP Cuff Size: Adult)   Pulse 97   Resp 14   Ht 1.854 m (6' 1\")   Wt 121 kg (266 lb)   SpO2 94%   Vitals:    01/31/22 1233   BP: 130/80   BP Location: Left arm   Patient Position: Sitting   BP Cuff Size: Adult   Pulse: 97   Resp: 14   SpO2: 94%   Weight: 121 kg (266 lb)   Height: 1.854 m (6' 1\")       Pt. is alert and oriented to time, place and person. Cooperative and in no apparent distress.     Constitutional: Alert, no distress, well-groomed.  Skin: No rashes in visible areas.  Eye: Round. Conjunctiva clear, lids normal. No icterus.   ENT EXAM  Nasal alae/valves collapsible: No   Nasal septum deviation: Yes  Nasal turbinate hypertrophy: Left: Grade 1   Right: Grade 1  Hard palate narrow: Yes  Hard palate high: Yes  Soft palate/uvula (Mallampati score): 4  Tongue Scalloping: Yes  Retrognathia: No   Micrognathia: No    Bite: Underbite 3mm  Cardiovascular:no murmus/gallops/rubs, normal S1 and S2 heart sounds, regular rate and rhythm  Pulmonary:Clear to auscultation, No wheezes, No crackles.  Neurologic:Awake, alert and oriented x 3, Normal age appropriate gait, No involuntary motions.  Extremities: No clubbing, cyanosis, or edema       ASSESSMENT AND PLAN   Erik Sandoval is a 36 y.o. male with obesity.  Presents to Sleep Clinic for evaluation of sleep apnea.    1. Erik Sandoval  has symptoms of Obstructive Sleep Apnea (KENNETH). Erik Sandoval has symptoms of snoring, choking/gasping during sleep, witnessed apnea, dry mouth.     Pt has risk factors for KENNETH include obesity, thick neck, and crowded oropharynx.     The pathophysiology of KENNETH and the increased risk of cardiovascular morbidity from untreated KENNTEH is discussed in detail with the patient.     We have discussed diagnostic options including in-laboratory, attended polysomnography and home sleep testing. " We have also discussed treatment options including airway pressurization, reconstructive otolaryngologic surgery, dental appliances and weight management.     Subsequently,treatment options will be discussed based on the diagnostic study. Meanwhile, He is urged to avoid supine sleep, weight gain and alcoholic beverages since all of these can worsen KENNETH. He is cautioned against drowsy driving. If He feels sleepy while driving, advised must pull over for a break/nap, rather than persist on the road, in the interest of Pt's own safety and that of others on the road.    Plan  -  He  will be scheduled for an overnight HST to assess sleep related breathing disorder.  - Follow up 1-2 weeks after sleep study to discuss results and treatment options moving forward   -Advised to reach out via MyChart or by phone with any questions or concerns.     2.  Regarding treatment of other past medical problems and general health maintenance,  Pt is urged to follow up with PCP.      Please note portions of this record was created using voice recognition software. I have made every reasonable attempt to correct obvious errors, but I expect that there are errors of grammar and possibly content I did not discover before finalizing the note.

## 2022-03-04 ENCOUNTER — HOME STUDY (OUTPATIENT)
Dept: SLEEP MEDICINE | Facility: MEDICAL CENTER | Age: 37
End: 2022-03-04
Attending: STUDENT IN AN ORGANIZED HEALTH CARE EDUCATION/TRAINING PROGRAM
Payer: COMMERCIAL

## 2022-03-04 DIAGNOSIS — R06.83 LOUD SNORING: ICD-10-CM

## 2022-03-04 DIAGNOSIS — G47.30 SLEEP DISORDER BREATHING: ICD-10-CM

## 2022-03-04 PROCEDURE — 95806 SLEEP STUDY UNATT&RESP EFFT: CPT | Performed by: STUDENT IN AN ORGANIZED HEALTH CARE EDUCATION/TRAINING PROGRAM

## 2022-03-08 NOTE — PROCEDURES
DIAGNOSTIC HOME SLEEP TEST (HST) REPORT       PATIENT ID:  NAME:  Erik Sandoval  MRN:               [id number]  YOB: 1985  DATE OF STUDY: 03/04/2022      Impression:     This study shows evidence of:     1.Severe obstructive sleep apnea with  Respiratory Event Index (JEANNETTE) of 36.1 per hour and worse in supine sleep with JEANNETTE at 48.8. These findings are based on the recording time (flow evaluation time). It is not possible with this device to determine a traditional apnea+hypopnea index (AHI) for total sleep time since EEG channels are not available.     2. Oxygenation O2 Sat. rickey was 70% and mean O2 sat was 90% and baseline O2 at 91 %. O2 sat was below 88% for 2hr 17 min of the flow evaluation time. Oxygen Desaturation (>=3%) Index was elevated at 38.2/hr. AVG HR was 65 BPM.      TECHNICAL DESCRIPTION: [employer] apnea link air device used was a type-III home study device. Home sleep study recording included: Airflow recording by nasal pressure transducer; Respiratory Effort by 1 RIP Band; Oxygen Saturation and heart rate by finger oximetry. A position sensor and a snore channel was also used.    Scoring Criteria: A modification of the the AASM Manual for the Scoring of Sleep and Associated Events, 2012, was used.   Obstructive apnea was scored by cessation of airflow for at least 10 seconds with continuing respiratory effort.  Central apnea was scored by cessation of airflow for at least 10 seconds with no effort.  Hypopnea was scored by a 30% or more reduction in airflow for at least 10 seconds accompanied by an arterial oxygen desaturation of 3% or more.  (For Medicare patients, hypopneas were scored by a 30% or more reduction in airflow for at least 10 seconds accompanied by an arterial oxygen saturation of 4% or more, as required by their insurance, CMS.        General sleep summary: . Total recording time is 9 hours and 41 minutes and total flow evaluation time is 9 hours and 29 minutes.  The patient spent 6 hours and 15 minutes in the supine position and 3 hours and 04 minutes in the nonsupine position.    Respiratory events:    Apneas: Total 40 (Obstructive apnea index 4.1/hr, Central apnea index 0.1 /hr, mixed 0 /hour)  Hypopneas: Total 303 with a Hypopnea index of 31.9 /hr    Recommendations:    1. CPAP titration study vs Auto CPAP trial . Due to severity of sleep apnea and sleep related hypoxia would recommend a in lab PAP titration study.   2.   In general patients with sleep apnea are advised to avoid alcohol and sedatives and to not operate a motor vehicle while drowsy. In some cases alternative treatment options may prove effective in resolving sleep apnea in these options include upper airway surgery, the use of a dental orthotic (Mild to Moderate KENNETH)  or weight loss and positional therapy. Clinical correlation is required.         Jonn Muniz MD

## 2022-04-04 ENCOUNTER — APPOINTMENT (OUTPATIENT)
Dept: SLEEP MEDICINE | Facility: MEDICAL CENTER | Age: 37
End: 2022-04-04
Payer: COMMERCIAL

## 2022-05-19 ENCOUNTER — OFFICE VISIT (OUTPATIENT)
Dept: SLEEP MEDICINE | Facility: MEDICAL CENTER | Age: 37
End: 2022-05-19
Payer: COMMERCIAL

## 2022-05-19 VITALS
BODY MASS INDEX: 34.85 KG/M2 | HEART RATE: 95 BPM | OXYGEN SATURATION: 96 % | RESPIRATION RATE: 16 BRPM | DIASTOLIC BLOOD PRESSURE: 70 MMHG | SYSTOLIC BLOOD PRESSURE: 110 MMHG | HEIGHT: 73 IN | WEIGHT: 263 LBS

## 2022-05-19 DIAGNOSIS — G47.34 NOCTURNAL HYPOXIA: ICD-10-CM

## 2022-05-19 DIAGNOSIS — G47.33 SEVERE OBSTRUCTIVE SLEEP APNEA: Primary | ICD-10-CM

## 2022-05-19 PROCEDURE — 99213 OFFICE O/P EST LOW 20 MIN: CPT | Performed by: STUDENT IN AN ORGANIZED HEALTH CARE EDUCATION/TRAINING PROGRAM

## 2022-05-19 NOTE — PROGRESS NOTES
[facility] Follow-up Visit    CC: Follow-up to discuss sleep study results      HPI:  Erik Sandoval is a 36 y.o.male  with obesity and severe obstructive sleep apnea with nocturnal hypoxia.  Presents today to discuss sleep study results.    States the night sleep study went well.  States it was a typical night sleep for him.    He continues to snore loudly in his sleep and have pauses in breathing.  Overall he continues to find his sleep refreshing.  Continues to wake 2-3 times a night for unknown reason.      Patient Active Problem List    Diagnosis Date Noted   • Tremor of both hands 11/29/2021   • KENNETH (obstructive sleep apnea) 11/29/2021   • Obesity 08/18/2018   • Other chest pain 08/18/2018       Past Medical History:   Diagnosis Date   • Kidney stones    • Renal disorder     kidney stones   • Snoring     no sleep study        Past Surgical History:   Procedure Laterality Date   • CYSTOSCOPY STENT PLACEMENT N/A 9/6/2018    Procedure: CYSTOSCOPY STENT PLACEMENT - RIGID W/STENT REMOVAL AND POSS STENT REPLACEMENT;  Surgeon: Cr Dee M.D.;  Location: [facility];  Service: Urology   • URETEROSCOPY Left 9/6/2018    Procedure: URETEROSCOPY - FLEXIBLE;  Surgeon: Cr Dee M.D.;  Location: [facility];  Service: Urology   • LASERTRIPSY Left 9/6/2018    Procedure: LASERTRIPSY - LITHO ;  Surgeon: Cr Dee M.D.;  Location: [facility];  Service: Urology   • CYSTOSCOPY STENT PLACEMENT Left 8/19/2018    Procedure: CYSTOSCOPY STENT PLACEMENT;  Surgeon: Cr Dee M.D.;  Location: [facility];  Service: Urology   • NASAL FRACTURE REDUCTION CLOSED         Family History   Problem Relation Age of Onset   • Stroke Father    • Diabetes Maternal Grandmother    • Diabetes Maternal Grandfather    • Diabetes Paternal Grandmother    • Diabetes Paternal Grandfather        Social History     Socioeconomic History   • Marital status:      Spouse name: Not  "on file   • Number of children: Not on file   • Years of education: Not on file   • Highest education level: Not on file   Occupational History   • Not on file   Tobacco Use   • Smoking status: Never Smoker   • Smokeless tobacco: Never Used   Substance and Sexual Activity   • Alcohol use: No   • Drug use: No   • Sexual activity: Yes     Partners: Female   Other Topics Concern   • Not on file   Social History Narrative   • Not on file     Social Determinants of Health     Financial Resource Strain: Not on file   Food Insecurity: Not on file   Transportation Needs: Not on file   Physical Activity: Not on file   Stress: Not on file   Social Connections: Not on file   Intimate Partner Violence: Not on file   Housing Stability: Not on file       No current outpatient medications on file.     No current facility-administered medications for this visit.        ALLERGIES: Oxycodone    ROS  Constitutional: Denies fevers, Denies weight changes  Ears/Nose/Throat/Mouth: Denies nasal congestion or sore throat   Cardiovascular: Denies chest pain  Respiratory: Denies shortness of breath, Denies cough  Gastrointestinal/Hepatic: Denies nausea, vomiting  Sleep: see HPI      PHYSICAL EXAM  /70 (BP Location: Left arm, Patient Position: Sitting, BP Cuff Size: Adult)   Pulse 95   Resp 16   Ht 1.854 m (6' 1\")   Wt 119 kg (263 lb)   SpO2 96%   BMI 34.70 kg/m²   Appearance: Well-nourished, well-developed, no acute distress  Eyes:  No scleral icterus , EOMI  ENMT: masked  Musculoskeletal:  Grossly normal; gait and station normal; digits and nails normal  Skin:  No rashes, petechiae, cyanosis  Neurologic: without focal signs; oriented to person, time, place, and purpose; judgement intact      Medical Decision Making   Assessment and Plan  Erik Sandoval is a 36 y.o.male  with obesity and severe obstructive sleep apnea with nocturnal hypoxia.  Presents today to discuss sleep study results.    Obstructive sleep apnea   Reviewed " recent HST with patient showing an AHI of 31.6,  and Min Oxygen saturation of 70%.  Time at or below 88% saturation of 2 hours 17 minutes.  Based on sleep study and symptoms meets criteria for severe obstructive sleep apnea.   We discussed the pathophysiology of obstructive sleep apnea (KENNETH) and risk factors for the disease. We also discussed possible consequences of untreated KENNETH, including excessive daytime sleepiness and fatigue, cognitive dysfunction, cardiovascular complications such as elevated blood pressure, heart attacks, cardiac arrhythmias, and strokes. We discussed how KENNETH typically gets worse with age. We discussed treatment options for KENNETH, including the gold standard therapy (PAP), alternative options such as a mandibular advancement device (custom-made oral appliances) and surgeries.     Due to severity of sleep apnea nocturnal hypoxia along with obesity patient would benefit from undergoing a in lab PSG titration study.  Difficult to determine if patient will need higher pressures than expected and may benefit from BiPAP therapy.    RECOMMENDATIONS  -We will be scheduled for PSG titration study  -Patient counseled to avoid driving when sleepy. Encouraged to anticipate sleepiness, consider taking a 10 min nap prior to driving, alternate with another , or pull over if sleepy while driving  -Advised to contact our office or myself with any questions via [employer]  -We will plan to message in my chart regarding sleep study results and order therapy accordingly.    Positive airway pressure will favorably impact many of the adverse conditions and effects provoked by KENNETH.    Have advised the patient to follow up with the appropriate healthcare practitioners for all other medical problems and issues.    Return in about 6 months (around 11/19/2022).      Please note portions of this record was created using voice recognition software. I have made every reasonable attempt to correct obvious errors, but I  expect that there are errors of grammar and possibly content I did not discover before finalizing the note.

## 2022-05-23 ENCOUNTER — OFFICE VISIT (OUTPATIENT)
Dept: URGENT CARE | Facility: PHYSICIAN GROUP | Age: 37
End: 2022-05-23
Payer: COMMERCIAL

## 2022-05-23 VITALS
TEMPERATURE: 99.3 F | HEART RATE: 100 BPM | SYSTOLIC BLOOD PRESSURE: 116 MMHG | WEIGHT: 260 LBS | RESPIRATION RATE: 20 BRPM | BODY MASS INDEX: 34.46 KG/M2 | OXYGEN SATURATION: 96 % | HEIGHT: 73 IN | DIASTOLIC BLOOD PRESSURE: 70 MMHG

## 2022-05-23 DIAGNOSIS — U07.1 COVID-19: ICD-10-CM

## 2022-05-23 DIAGNOSIS — R50.9 FEVER, UNSPECIFIED FEVER CAUSE: ICD-10-CM

## 2022-05-23 DIAGNOSIS — R31.9 HEMATURIA, UNSPECIFIED TYPE: ICD-10-CM

## 2022-05-23 DIAGNOSIS — R53.83 FATIGUE, UNSPECIFIED TYPE: ICD-10-CM

## 2022-05-23 DIAGNOSIS — Z87.442 HISTORY OF RENAL STONE: ICD-10-CM

## 2022-05-23 DIAGNOSIS — M54.50 ACUTE BILATERAL LOW BACK PAIN WITHOUT SCIATICA: ICD-10-CM

## 2022-05-23 LAB
APPEARANCE UR: NORMAL
BILIRUB UR STRIP-MCNC: NEGATIVE MG/DL
COLOR UR AUTO: NORMAL
EXTERNAL QUALITY CONTROL: ABNORMAL
FLUAV+FLUBV AG SPEC QL IA: NEGATIVE
GLUCOSE UR STRIP.AUTO-MCNC: NEGATIVE MG/DL
INT CON NEG: NORMAL
INT CON POS: NORMAL
KETONES UR STRIP.AUTO-MCNC: NEGATIVE MG/DL
LEUKOCYTE ESTERASE UR QL STRIP.AUTO: NEGATIVE
NITRITE UR QL STRIP.AUTO: NEGATIVE
PH UR STRIP.AUTO: 5.5 [PH] (ref 5–8)
PROT UR QL STRIP: NEGATIVE MG/DL
RBC UR QL AUTO: NORMAL
SARS-COV+SARS-COV-2 AG RESP QL IA.RAPID: POSITIVE
SP GR UR STRIP.AUTO: 1.02
UROBILINOGEN UR STRIP-MCNC: 0.2 MG/DL

## 2022-05-23 PROCEDURE — 81002 URINALYSIS NONAUTO W/O SCOPE: CPT

## 2022-05-23 PROCEDURE — 87804 INFLUENZA ASSAY W/OPTIC: CPT

## 2022-05-23 PROCEDURE — 99213 OFFICE O/P EST LOW 20 MIN: CPT | Mod: CS

## 2022-05-23 PROCEDURE — 87426 SARSCOV CORONAVIRUS AG IA: CPT

## 2022-05-23 ASSESSMENT — ENCOUNTER SYMPTOMS
VOMITING: 1
CONSTIPATION: 0
DIARRHEA: 1
FEVER: 1
COUGH: 1
SORE THROAT: 1
SPUTUM PRODUCTION: 0
NAUSEA: 0
MYALGIAS: 1
CHILLS: 0
BACK PAIN: 1

## 2022-05-23 NOTE — PROGRESS NOTES
"Subjective     Erik Sandoval is a 36 y.o. male who presents with Other (Possible kidney stone,fever,vomiting,x1 day)          HPI     Patient presents today with symptoms that started yesterday.  Patient reports fever, low back pain , fatigue. He reports that he was overall \"not feeling well\". He reports his highest temperature was 102, last fever this morning at 7 am. Patient also endorses sore throat, cough, and vomiting. He reported vomiting once today. This was also accompaneid by diarrhea that started  today.     Patient also reports some dysuria and urinary frequency. He denies any flank pains or hematuria.  Patient reports history of kidney stones in the past.  Patient's CT scan on 4/23/2021 showed left distal ureteral 7 mm calculus that is causing moderate hydro ureteronephrosis and mild fat stranding.  Bilateral nephroliths are also observed. Patient reports undergoing surgery with stent placement at [facility].     Patient's current problem list, medications, and past medical/surgical history were reviewed in [facility].      Review of Systems   Constitutional: Positive for fever and malaise/fatigue. Negative for chills.   HENT: Positive for sore throat. Negative for congestion.    Respiratory: Positive for cough. Negative for sputum production.    Gastrointestinal: Positive for diarrhea and vomiting. Negative for constipation and nausea.   Genitourinary: Negative for urgency.   Musculoskeletal: Positive for back pain and myalgias.     PMH:  has a past medical history of Kidney stones, Renal disorder, and Snoring.  MEDS: No current outpatient medications on file.  ALLERGIES:   Allergies   Allergen Reactions   • Oxycodone Nausea     Even with anti nausea med. Does not like feeling     SURGHX:   Past Surgical History:   Procedure Laterality Date   • CYSTOSCOPY STENT PLACEMENT N/A 9/6/2018    Procedure: CYSTOSCOPY STENT PLACEMENT - RIGID W/STENT REMOVAL AND POSS STENT REPLACEMENT;  Surgeon: Cr Dee M.D.;  " "Location: SUR[facility]ERY [facility];  Service: Urology   • URETEROSCOPY Left 9/6/2018    Procedure: URETEROSCOPY - FLEXIBLE;  Surgeon: Cr Dee M.D.;  Location: SUR[facility]ERY [facility];  Service: Urology   • LASERTRIPSY Left 9/6/2018    Procedure: LASERTRIPSY - LITHO ;  Surgeon: Cr Dee M.D.;  Location: SUR[facility]ERY [facility];  Service: Urology   • CYSTOSCOPY STENT PLACEMENT Left 8/19/2018    Procedure: CYSTOSCOPY STENT PLACEMENT;  Surgeon: Cr Dee M.D.;  Location: SUR[facility]ERY [facility];  Service: Urology   • NASAL FRACTURE REDUCTION CLOSED       SOCHX:  reports that he has never smoked. He has never used smokeless tobacco. He reports that he does not drink alcohol and does not use drugs.  FH: Reviewed with patient, not pertinent to this visit.            Objective     /70 (BP Location: Right arm, Patient Position: Sitting, BP Cuff Size: Adult)   Pulse 100   Temp 37.4 °C (99.3 °F) (Temporal)   Resp 20   Ht 1.854 m (6' 1\")   Wt 118 kg (260 lb)   SpO2 96%   BMI 34.30 kg/m²      Physical Exam  Constitutional:       Appearance: Normal appearance.   HENT:      Head: Normocephalic.      Nose: Nose normal.      Mouth/Throat:      Pharynx: Posterior oropharyngeal erythema present. No oropharyngeal exudate.   Eyes:      Extraocular Movements: Extraocular movements intact.   Cardiovascular:      Rate and Rhythm: Normal rate and regular rhythm.      Pulses: Normal pulses.      Heart sounds: Normal heart sounds.   Pulmonary:      Effort: Pulmonary effort is normal.      Breath sounds: Normal breath sounds.   Abdominal:      Tenderness: There is no right CVA tenderness or left CVA tenderness.   Musculoskeletal:         General: Normal range of motion.      Cervical back: Normal range of motion.   Skin:     General: Skin is warm.   Neurological:      General: No focal deficit present.      Mental Status: He is alert.   Psychiatric:         Mood and Affect: Mood normal.         Behavior: Behavior " normal.     Results:     Urinalysis:  Blood-moderate    COVID- positive  Influenza- negative     Assessment & Plan      1. COVID-19      2. Fever, unspecified fever cause    - POCT INFLUENZA A/B  - POCT SARS-COV Antigen RIAZ manual result (SR[facility] Only); Standing  - POCT SARS-COV Antigen RIAZ manual result (SRG Only)    3. Fatigue, unspecified type    - POCT INFLUENZA A/B  - POCT SARS-COV Antigen RIAZ manual result ([facility]G Only); Standing  - POCT SARS-COV Antigen RIAZ manual result (SRG Only)    4. Acute bilateral low back pain without sciatica      5. Hematuria, unspecified type      6. History of renal stone      Patient tested positive for COVID-19 here in the clinic today.  He is instructed to quarantine per CDC guidelines.  Educated on supportive treatment.  Discussed treatment plan with patient, he is agreeable and verbalized understanding.    Recommended supportive treatment at home:  OTC Tylenol or Motrin for fever/discomfort.  OTC supportive care for nasal congestion - saline nasal spray/Flonase nasal spray and/or netipot  Humidifier and steam inhalation/warm showers.  Increase oral fluid intake.    Patient reports back pain.  His physical exam is unremarkable, negative abdominal exam as well as CVA tenderness.  Patient has blood in urine.  Instructed patient to follow-up with PCP or return to urgent care for further evaluation and management for possible renal stone once he is out of the quarantine period.  Educated patient on signs and symptoms watch out for, when to return to the clinic or go to the ER.    Electronically Signed by HECTOR Almeida

## 2022-06-24 ENCOUNTER — SLEEP STUDY (OUTPATIENT)
Dept: SLEEP MEDICINE | Facility: MEDICAL CENTER | Age: 37
End: 2022-06-24
Attending: STUDENT IN AN ORGANIZED HEALTH CARE EDUCATION/TRAINING PROGRAM
Payer: COMMERCIAL

## 2022-06-24 DIAGNOSIS — G47.33 SEVERE OBSTRUCTIVE SLEEP APNEA: ICD-10-CM

## 2022-06-24 DIAGNOSIS — G47.34 NOCTURNAL HYPOXIA: ICD-10-CM

## 2022-06-24 PROCEDURE — 95811 POLYSOM 6/>YRS CPAP 4/> PARM: CPT | Performed by: STUDENT IN AN ORGANIZED HEALTH CARE EDUCATION/TRAINING PROGRAM

## 2022-06-27 NOTE — PROCEDURES
MONTAGE: Standard  STUDY TYPE: Treatment    RECORDING TECHNIQUE:   After the scalp was prepared, gold plated electrodes were applied to the scalp according to the International 10-20 System. EEG (electroencephalogram) was continuously monitored from the O1-M2, O2-M1, C3-M2, C4-M1, F3-M2, and F4-M1. EOGs (electrooculograms) were monitored by electrodes placed at the left and right outer canthi. Chin EMG (electromyogram) was monitored by electrodes placed on the mentalis and sub-mentalis muscles. Nasal and oral airflow were monitored using a triple port thermocouple as well as oronasal pressure transducer. Respiratory effort was measured by inductive plethysmography technology employing abdominal and thoracic belts. Blood oxygen saturation and pulse were monitored by pulse oximetry. Heart rhythm was monitored by surface electrocardiogram. Leg EMG was studied using surface electrodes placed on left and right anterior tibialis. A microphone was used to monitor tracheal sounds and snoring. Body position was monitored and documented by technician observation.   SCORING CRITERIA:   A modification of the AASM manual for scoring of sleep and associated events was used. Obstructive apneas were scored by cessation of airflow for at least 10 seconds with continuing respiratory effort. Central apneas were scored by cessation of airflow for at least 10 seconds with no respiratory effort. Hypopneas were scored by a 30% or more reduction in airflow for at least 10 seconds accompanied by arterial oxygen desaturation of 3% or an arousal. For CMS (Medicare) patients, per AASM rule 1B, hypopneas are scored by 30% with mild reduction in airflow for at least 10 seconds accompanied by arterial saturation decreased at 4%.    Study start time was 10:22:57 PM. Diagnostic recording time was 7h 32.0m with a total sleep time of 7h 10.5m resulting in a sleep efficiency of 95.24%%. Sleep latency from the start of the study was 00 minutes and the  latency from sleep to REM was 51 minutes. In total,39 arousals were scored for an arousal index of 5.4.  Respiratory:  There were a total of 13 apneas consisting of 10 obstructive apneas, 0 mixed apneas, and 3 central apneas. A total of 61 hypopneas were scored. The apnea index was 1.81 per hour and the hypopnea index was 8.50 per hour resulting in an overall AHI of 10.31. AHI during rem was 8.9 and AHI while supine was 15.38.  Oximetry:  There was a mean oxygen saturation of 93.0%. The minimum oxygen saturation in NREM was 82.0 % and in REM was 71.0%. The patient spent 16.3 minutes of TST with SaO2 <88%.  Cardiac:  The highest heart rate seen while awake was 96 BPM while the highest heart rate during sleep was 90 BPM with an average sleeping heart rate of 66 BPM.  Limb Movements:  There were a total of 6 PLMs during sleep which resulted in a PLMS index of 0.8. Of these, 6 were associated with arousals which resulted in a PLMS arousal index of 0.8.  Titration: CPAP was tried from 5cm H2O to 14cm H2O.  This was a fully attended sleep study. This test was technically adequate. This patient was titrated on CPAP starting at 5 cm of water pressure. Patient was titrated up to 14 cm of water pressure. Patient did best at 14 cm of water pressure. Patient spent 100 minutes at that pressure and their AHI was 5.4 which is considered Mild obstructive sleep apnea.     Impression:  1.  Responded well to CPAP therapy  2.  Improved at higher pressure range 13-14   3.  Supine REM sleep was seen    Recommendations:  I recommend Auto CPAP of 10-14 cm with Airfit F30 mask.     I also recommend 30 day compliance download to assess the efficacy to the recommended pressure, measure leak, apnea hypopnea index and compliance for further outpatient monitoring and management of CPAP therapy. In some cases alternative treatment options may be proven effective in resolving sleep apnea. These options include upper airway surgery, the use of a  dental orthotic, weight loss orpositional therapy. Clinical correlation is required. In general patients with sleep apnea are advised to avoid alcohol, sedatives and not to operate a motor vehicle while drowsy.  Untreated sleep apnea increases the risk for cardiovascular and neurovascular disease.

## 2022-10-20 ENCOUNTER — OFFICE VISIT (OUTPATIENT)
Dept: SLEEP MEDICINE | Facility: MEDICAL CENTER | Age: 37
End: 2022-10-20
Payer: COMMERCIAL

## 2022-10-20 VITALS
DIASTOLIC BLOOD PRESSURE: 76 MMHG | OXYGEN SATURATION: 96 % | SYSTOLIC BLOOD PRESSURE: 130 MMHG | WEIGHT: 264 LBS | HEIGHT: 73 IN | HEART RATE: 90 BPM | RESPIRATION RATE: 14 BRPM | BODY MASS INDEX: 34.99 KG/M2

## 2022-10-20 DIAGNOSIS — G47.33 SEVERE OBSTRUCTIVE SLEEP APNEA: Primary | ICD-10-CM

## 2022-10-20 PROCEDURE — 99213 OFFICE O/P EST LOW 20 MIN: CPT | Performed by: STUDENT IN AN ORGANIZED HEALTH CARE EDUCATION/TRAINING PROGRAM

## 2022-10-20 NOTE — PROGRESS NOTES
[facility] Follow-up Visit    CC: Follow-up for first compliance visit after receiving CPAP machine      HPI:  Erik Sandoval is a 37 y.o.male  with obesity, and severe obstructive sleep apnea with nocturnal hypoxia on CPAP.  Presents today to follow-up regarding first compliance visit.      Since last visit he has received his CPAP machine.  He states with using CPAP his snoring is gone.  He is finding his sleep restorative.  With using CPAP he feels that he does get a deeper sleep and feels more rested.    States he is working more overtime lately and has not been sleeping that much at night.  States he has been going to bed around the same time but going in early such as 1 AM or 2 AM in the morning.    States overall he is happy with his current pressure and masks.  Does not have any complaints at this time.  He has not received supplies through [employer].    DME provider: [employer]  Device: Airsense 11  Mask: fullface  Aerophagia: No   Snoring: No   Dry mouth: No   Leak: No   Skin irritation: No   Chin strap: No       Sleep History  3/4/2022 severe obstructive sleep apnea was seen on home sleep study with an overall JEANNETTE of 36.1, minimum oxygen saturation 70%, time at or below 88% saturation 2 hours 17 minutes.  6/24/2022 PSG titration study showed improvement in respiratory events with CPAP.  Recommended pressure with 10-14 cmH2O.    Patient Active Problem List    Diagnosis Date Noted    Tremor of both hands 11/29/2021    KENNETH (obstructive sleep apnea) 11/29/2021    Obesity 08/18/2018    Other chest pain 08/18/2018       Past Medical History:   Diagnosis Date    Kidney stones     Renal disorder     kidney stones    Snoring     no sleep study        Past Surgical History:   Procedure Laterality Date    CYSTOSCOPY STENT PLACEMENT N/A 9/6/2018    Procedure: CYSTOSCOPY STENT PLACEMENT - RIGID W/STENT REMOVAL AND POSS STENT REPLACEMENT;  Surgeon: Cr Dee M.D.;  Location: SURGERY [facility];  Service:  Urology    URETEROSCOPY Left 9/6/2018    Procedure: URETEROSCOPY - FLEXIBLE;  Surgeon: Cr Dee M.D.;  Location: SURGERY [facility];  Service: Urology    LASERTRIPSY Left 9/6/2018    Procedure: LASERTRIPSY - LITHO ;  Surgeon: Cr Dee M.D.;  Location: SURGERY [facility];  Service: Urology    CYSTOSCOPY STENT PLACEMENT Left 8/19/2018    Procedure: CYSTOSCOPY STENT PLACEMENT;  Surgeon: Cr Dee M.D.;  Location: SURGERY [facility];  Service: Urology    NASAL FRACTURE REDUCTION CLOSED         Family History   Problem Relation Age of Onset    Stroke Father     Diabetes Maternal Grandmother     Diabetes Maternal Grandfather     Diabetes Paternal Grandmother     Diabetes Paternal Grandfather        Social History     Socioeconomic History    Marital status:      Spouse name: Not on file    Number of children: Not on file    Years of education: Not on file    Highest education level: Not on file   Occupational History    Not on file   Tobacco Use    Smoking status: Never    Smokeless tobacco: Never   Substance and Sexual Activity    Alcohol use: No    Drug use: No    Sexual activity: Yes     Partners: Female   Other Topics Concern    Not on file   Social History Narrative    Not on file     Social Determinants of Health     Financial Resource Strain: Not on file   Food Insecurity: Not on file   Transportation Needs: Not on file   Physical Activity: Not on file   Stress: Not on file   Social Connections: Not on file   Intimate Partner Violence: Not on file   Housing Stability: Not on file       No current outpatient medications on file.     No current facility-administered medications for this visit.        ALLERGIES: Oxycodone    ROS  Constitutional: Denies fevers, Denies weight changes  Ears/Nose/Throat/Mouth: Denies nasal congestion or sore throat   Cardiovascular: Denies chest pain  Respiratory: Denies shortness of breath, Denies cough  Gastrointestinal/Hepatic: Denies nausea,  "vomiting  Sleep: see HPI      PHYSICAL EXAM  /76 (BP Location: Left arm, Patient Position: Sitting, BP Cuff Size: Large adult)   Pulse 90   Resp 14   Ht 1.854 m (6' 1\")   Wt 120 kg (264 lb)   SpO2 96%   BMI 34.83 kg/m²   Appearance: Well-nourished, well-developed, no acute distress  Eyes:  No scleral icterus , EOMI  ENMT: masked  Musculoskeletal:  Grossly normal; gait and station normal; digits and nails normal  Skin:  No rashes, petechiae, cyanosis  Neurologic: without focal signs; oriented to person, time, place, and purpose; judgement intact      Medical Decision Making   Assessment and Plan  Erik Sandoval is a 37 y.o.male  with obesity, and severe obstructive sleep apnea with nocturnal hypoxia on CPAP.  Presents today to follow-up regarding first compliance visit.      The medical record was reviewed.    Obstructive sleep apnea  Diagnostic and titration nocturnal polysomnogram's, home sleep apnea tests, continuous nocturnal oximetry results, multiple sleep latency tests, and compliance reports reviewed.    Compliance data reviewed showing 78% usage > 4hours in last 90  days. Average AHI 0.76 events/hour.  Pt continues to use and benefit from machine.     Current settings are 10-14 cmH2O.      PLAN:   -Order placed for mask and supplies   -Advised to reach out via MyChart with questions     Has been advised to continue the current CPAP, clean equipment frequently, and get new mask and supplies as allowed by insurance and DME. Recommend an earlier appointment, if significant treatment barriers develop.    The risks of untreated sleep apnea were discussed with the patient at length. Patients with KENNETH are at increased risk of cardiovascular disease including coronary artery disease, systemic arterial hypertension, pulmonary arterial hypertension, cardiac arrythmias, and stroke. The patient was advised to avoid driving a motor vehicle when drowsy.    Positive airway pressure will favorably impact many " of the adverse conditions and effects provoked by KENNETH.    Have advised the patient to follow up with the appropriate healthcare practitioners for all other medical problems and issues.    Return in about 1 year (around 10/20/2023).      Please note portions of this record was created using voice recognition software. I have made every reasonable attempt to correct obvious errors, but I expect that there are errors of grammar and possibly content I did not discover before finalizing the note.

## 2024-08-18 ENCOUNTER — OFFICE VISIT (OUTPATIENT)
Dept: URGENT CARE | Facility: PHYSICIAN GROUP | Age: 39
End: 2024-08-18
Payer: COMMERCIAL

## 2024-08-18 ENCOUNTER — HOSPITAL ENCOUNTER (OUTPATIENT)
Dept: RADIOLOGY | Facility: MEDICAL CENTER | Age: 39
End: 2024-08-18
Attending: FAMILY MEDICINE
Payer: COMMERCIAL

## 2024-08-18 VITALS
HEIGHT: 73 IN | SYSTOLIC BLOOD PRESSURE: 130 MMHG | HEART RATE: 76 BPM | DIASTOLIC BLOOD PRESSURE: 86 MMHG | WEIGHT: 270.9 LBS | OXYGEN SATURATION: 98 % | RESPIRATION RATE: 12 BRPM | BODY MASS INDEX: 35.9 KG/M2

## 2024-08-18 DIAGNOSIS — N20.1 LEFT URETERAL STONE: ICD-10-CM

## 2024-08-18 DIAGNOSIS — R31.9 HEMATURIA, UNSPECIFIED TYPE: ICD-10-CM

## 2024-08-18 DIAGNOSIS — R10.9 LEFT FLANK PAIN: ICD-10-CM

## 2024-08-18 DIAGNOSIS — Z87.442 HISTORY OF KIDNEY STONES: ICD-10-CM

## 2024-08-18 DIAGNOSIS — G57.12 MERALGIA PARAESTHETICA, LEFT: ICD-10-CM

## 2024-08-18 LAB
APPEARANCE UR: CLEAR
BILIRUB UR STRIP-MCNC: NEGATIVE MG/DL
COLOR UR AUTO: YELLOW
GLUCOSE UR STRIP.AUTO-MCNC: NEGATIVE MG/DL
KETONES UR STRIP.AUTO-MCNC: NEGATIVE MG/DL
LEUKOCYTE ESTERASE UR QL STRIP.AUTO: NEGATIVE
NITRITE UR QL STRIP.AUTO: NEGATIVE
PH UR STRIP.AUTO: 5.5 [PH] (ref 5–8)
PROT UR QL STRIP: NEGATIVE MG/DL
RBC UR QL AUTO: NORMAL
SP GR UR STRIP.AUTO: 1.02
UROBILINOGEN UR STRIP-MCNC: 0.2 MG/DL

## 2024-08-18 PROCEDURE — 81002 URINALYSIS NONAUTO W/O SCOPE: CPT | Performed by: FAMILY MEDICINE

## 2024-08-18 PROCEDURE — 3075F SYST BP GE 130 - 139MM HG: CPT | Performed by: FAMILY MEDICINE

## 2024-08-18 PROCEDURE — 99214 OFFICE O/P EST MOD 30 MIN: CPT | Performed by: FAMILY MEDICINE

## 2024-08-18 PROCEDURE — 74176 CT ABD & PELVIS W/O CONTRAST: CPT

## 2024-08-18 PROCEDURE — 3079F DIAST BP 80-89 MM HG: CPT | Performed by: FAMILY MEDICINE

## 2024-08-18 ASSESSMENT — ENCOUNTER SYMPTOMS
MYALGIAS: 0
VOMITING: 0
EYE REDNESS: 0
EYE DISCHARGE: 0
WEIGHT LOSS: 0
NAUSEA: 0

## 2024-08-18 NOTE — PROGRESS NOTES
"Subjective     Erik Sandoval is a 38 y.o. male who presents with Abdominal Pain (And back pain / numbness bilateral arms, hand , and feet for 2mo  )            2 months intermittent numbness most prominent left anterior thigh.  He also has intermittent back pain with radiation.  No myelopathy.  No fever.  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  No weakness.    Intermittent left flank pain.  PMH kidney stone.  Pain radiates to left groin.  No obvious blood in urine.  No fever.  No other aggravating or alleviating factors.        Review of Systems   Constitutional:  Negative for malaise/fatigue and weight loss.   Eyes:  Negative for discharge and redness.   Gastrointestinal:  Negative for nausea and vomiting.   Musculoskeletal:  Negative for joint pain and myalgias.   Skin:  Negative for itching and rash.              Objective     /86 (BP Location: Right arm, Patient Position: Sitting, BP Cuff Size: Adult)   Pulse 76   Resp 12   Ht 1.854 m (6' 1\")   Wt 123 kg (270 lb 14.4 oz)   SpO2 98%   BMI 35.74 kg/m²      Physical Exam  Constitutional:       General: He is not in acute distress.     Appearance: He is well-developed.   HENT:      Head: Normocephalic and atraumatic.   Eyes:      Conjunctiva/sclera: Conjunctivae normal.   Cardiovascular:      Rate and Rhythm: Normal rate and regular rhythm.      Heart sounds: Normal heart sounds. No murmur heard.  Pulmonary:      Effort: Pulmonary effort is normal.      Breath sounds: Normal breath sounds. No wheezing.   Abdominal:      Palpations: Abdomen is soft.      Tenderness: There is no right CVA tenderness or left CVA tenderness.   Musculoskeletal:      Comments: No point tenderness lumbar spine.   Skin:     General: Skin is warm and dry.      Findings: No rash.   Neurological:      Mental Status: He is alert.      Deep Tendon Reflexes: Reflexes normal.      Comments: Diminished sensory to light and sharp touch left anterior thigh.  No other " focal deficits.                             Assessment & Plan      UA reviewed  Assessment & Plan  Left flank pain    Orders:    POCT Urinalysis    Referral to establish with PCP    CT-RENAL COLIC EVALUATION(A/P W/O); Future    Meralgia paraesthetica, left    Orders:    Referral to establish with PCP    History of kidney stones    Orders:    Referral to establish with PCP    Hematuria, unspecified type    Orders:    CT-RENAL COLIC EVALUATION(A/P W/O); Future       Differential diagnosis, natural history, supportive care, and indications for immediate follow-up were discussed.     Patient education causes of meralgia paresthetica.  Will try to avoid tight fitting jeans, total belts.    F/u CT

## 2024-08-18 NOTE — ADDENDUM NOTE
Addended by: JESSICA EDOUARD on: 8/18/2024 03:37 PM     Modules accepted: Orders     It was great meeting your family in clinic today!    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 At the same time we need to make sure she is not starting puberty early.    Recommendations:  - blood tests and Xray today    The labs will take 2 weeks to come back. Please call our office if you don't hear from me by then.     Please follow-up in clinic in 6 months.     Contact information:   General phone number: [phone]   Fax: [phone]     Non-urgent, lab or prescription questions:   Endocrine nursing line: [phone] (Jarred Aquino) or [phone] (Nica Faye)   Diabetes: [phone] OR email [email or web address]

## 2024-08-19 ENCOUNTER — HOSPITAL ENCOUNTER (OUTPATIENT)
Dept: LAB | Facility: MEDICAL CENTER | Age: 39
End: 2024-08-19
Attending: FAMILY MEDICINE
Payer: COMMERCIAL

## 2024-08-19 DIAGNOSIS — N20.1 LEFT URETERAL STONE: ICD-10-CM

## 2024-08-19 LAB
ANION GAP SERPL CALC-SCNC: 11 MMOL/L (ref 7–16)
BASOPHILS # BLD AUTO: 0.4 % (ref 0–1.8)
BASOPHILS # BLD: 0.03 K/UL (ref 0–0.12)
BUN SERPL-MCNC: 15 MG/DL (ref 8–22)
CALCIUM SERPL-MCNC: 9 MG/DL (ref 8.5–10.5)
CHLORIDE SERPL-SCNC: 103 MMOL/L (ref 96–112)
CO2 SERPL-SCNC: 25 MMOL/L (ref 20–33)
CREAT SERPL-MCNC: 0.88 MG/DL (ref 0.5–1.4)
EOSINOPHIL # BLD AUTO: 0.08 K/UL (ref 0–0.51)
EOSINOPHIL NFR BLD: 1.1 % (ref 0–6.9)
ERYTHROCYTE [DISTWIDTH] IN BLOOD BY AUTOMATED COUNT: 42 FL (ref 35.9–50)
GFR SERPLBLD CREATININE-BSD FMLA CKD-EPI: 113 ML/MIN/1.73 M 2
GLUCOSE SERPL-MCNC: 113 MG/DL (ref 65–99)
HCT VFR BLD AUTO: 48.5 % (ref 42–52)
HGB BLD-MCNC: 16.2 G/DL (ref 14–18)
IMM GRANULOCYTES # BLD AUTO: 0.04 K/UL (ref 0–0.11)
IMM GRANULOCYTES NFR BLD AUTO: 0.5 % (ref 0–0.9)
LYMPHOCYTES # BLD AUTO: 2.16 K/UL (ref 1–4.8)
LYMPHOCYTES NFR BLD: 29.3 % (ref 22–41)
MCH RBC QN AUTO: 29.3 PG (ref 27–33)
MCHC RBC AUTO-ENTMCNC: 33.4 G/DL (ref 32.3–36.5)
MCV RBC AUTO: 87.7 FL (ref 81.4–97.8)
MONOCYTES # BLD AUTO: 0.67 K/UL (ref 0–0.85)
MONOCYTES NFR BLD AUTO: 9.1 % (ref 0–13.4)
NEUTROPHILS # BLD AUTO: 4.38 K/UL (ref 1.82–7.42)
NEUTROPHILS NFR BLD: 59.6 % (ref 44–72)
NRBC # BLD AUTO: 0 K/UL
NRBC BLD-RTO: 0 /100 WBC (ref 0–0.2)
PLATELET # BLD AUTO: 213 K/UL (ref 164–446)
PMV BLD AUTO: 9.8 FL (ref 9–12.9)
POTASSIUM SERPL-SCNC: 4.3 MMOL/L (ref 3.6–5.5)
RBC # BLD AUTO: 5.53 M/UL (ref 4.7–6.1)
SODIUM SERPL-SCNC: 139 MMOL/L (ref 135–145)
WBC # BLD AUTO: 7.4 K/UL (ref 4.8–10.8)

## 2024-08-19 PROCEDURE — 85025 COMPLETE CBC W/AUTO DIFF WBC: CPT

## 2024-08-19 PROCEDURE — 80048 BASIC METABOLIC PNL TOTAL CA: CPT

## 2024-08-19 PROCEDURE — 36415 COLL VENOUS BLD VENIPUNCTURE: CPT

## 2024-08-21 ENCOUNTER — HOSPITAL ENCOUNTER (OUTPATIENT)
Facility: MEDICAL CENTER | Age: 39
End: 2024-08-21
Attending: UROLOGY
Payer: COMMERCIAL

## 2024-08-21 PROCEDURE — 87086 URINE CULTURE/COLONY COUNT: CPT

## 2024-08-23 LAB
BACTERIA UR CULT: NORMAL
SIGNIFICANT IND 70042: NORMAL
SITE SITE: NORMAL
SOURCE SOURCE: NORMAL

## 2024-11-05 ENCOUNTER — HOSPITAL ENCOUNTER (OUTPATIENT)
Facility: MEDICAL CENTER | Age: 39
End: 2024-11-05
Attending: UROLOGY
Payer: COMMERCIAL

## 2024-11-05 LAB
APPEARANCE UR: CLEAR
BACTERIA #/AREA URNS HPF: ABNORMAL /HPF
BILIRUB UR QL STRIP.AUTO: NEGATIVE
CASTS URNS QL MICRO: ABNORMAL /LPF (ref 0–2)
COLOR UR: YELLOW
EPITHELIAL CELLS 1715: ABNORMAL /HPF (ref 0–5)
GLUCOSE UR STRIP.AUTO-MCNC: NEGATIVE MG/DL
KETONES UR STRIP.AUTO-MCNC: NEGATIVE MG/DL
LEUKOCYTE ESTERASE UR QL STRIP.AUTO: NEGATIVE
MICRO URNS: ABNORMAL
NITRITE UR QL STRIP.AUTO: NEGATIVE
PH UR STRIP.AUTO: 6 [PH] (ref 5–8)
PROT UR QL STRIP: NEGATIVE MG/DL
RBC # URNS HPF: ABNORMAL /HPF (ref 0–2)
RBC UR QL AUTO: ABNORMAL
SP GR UR STRIP.AUTO: 1.02
UROBILINOGEN UR STRIP.AUTO-MCNC: 1 EU/DL
WBC #/AREA URNS HPF: ABNORMAL /HPF

## 2024-11-05 PROCEDURE — 87086 URINE CULTURE/COLONY COUNT: CPT

## 2024-11-05 PROCEDURE — 81001 URINALYSIS AUTO W/SCOPE: CPT

## 2024-11-07 LAB
BACTERIA UR CULT: NORMAL
SIGNIFICANT IND 70042: NORMAL
SITE SITE: NORMAL
SOURCE SOURCE: NORMAL

## 2024-12-20 ENCOUNTER — HOSPITAL ENCOUNTER (OUTPATIENT)
Dept: RADIOLOGY | Facility: MEDICAL CENTER | Age: 39
End: 2024-12-20
Attending: STUDENT IN AN ORGANIZED HEALTH CARE EDUCATION/TRAINING PROGRAM
Payer: COMMERCIAL

## 2024-12-20 DIAGNOSIS — R10.9 ACUTE ABDOMINAL PAIN: ICD-10-CM

## 2024-12-20 PROCEDURE — 74176 CT ABD & PELVIS W/O CONTRAST: CPT

## 2025-01-09 ENCOUNTER — TELEPHONE (OUTPATIENT)
Dept: SCHEDULING | Facility: IMAGING CENTER | Age: 40
End: 2025-01-09
Payer: COMMERCIAL

## 2025-05-02 ENCOUNTER — HOSPITAL ENCOUNTER (EMERGENCY)
Facility: MEDICAL CENTER | Age: 40
End: 2025-05-02
Attending: EMERGENCY MEDICINE
Payer: COMMERCIAL

## 2025-05-02 ENCOUNTER — APPOINTMENT (OUTPATIENT)
Dept: RADIOLOGY | Facility: MEDICAL CENTER | Age: 40
End: 2025-05-02
Attending: EMERGENCY MEDICINE
Payer: COMMERCIAL

## 2025-05-02 VITALS
HEART RATE: 65 BPM | HEIGHT: 73 IN | OXYGEN SATURATION: 97 % | SYSTOLIC BLOOD PRESSURE: 145 MMHG | BODY MASS INDEX: 33.13 KG/M2 | DIASTOLIC BLOOD PRESSURE: 95 MMHG | WEIGHT: 250 LBS | RESPIRATION RATE: 18 BRPM | TEMPERATURE: 97 F

## 2025-05-02 DIAGNOSIS — R07.9 CHEST PAIN, UNSPECIFIED TYPE: ICD-10-CM

## 2025-05-02 DIAGNOSIS — N20.1 URETEROLITHIASIS: ICD-10-CM

## 2025-05-02 LAB
ALBUMIN SERPL BCP-MCNC: 4.3 G/DL (ref 3.2–4.9)
ALBUMIN/GLOB SERPL: 1.4 G/DL
ALP SERPL-CCNC: 55 U/L (ref 30–99)
ALT SERPL-CCNC: 38 U/L (ref 2–50)
ANION GAP SERPL CALC-SCNC: 14 MMOL/L (ref 7–16)
APPEARANCE UR: CLEAR
AST SERPL-CCNC: 25 U/L (ref 12–45)
BACTERIA #/AREA URNS HPF: ABNORMAL /HPF
BASOPHILS # BLD AUTO: 0.2 % (ref 0–1.8)
BASOPHILS # BLD: 0.02 K/UL (ref 0–0.12)
BILIRUB SERPL-MCNC: 0.6 MG/DL (ref 0.1–1.5)
BILIRUB UR QL STRIP.AUTO: NEGATIVE
BUN SERPL-MCNC: 17 MG/DL (ref 8–22)
CALCIUM ALBUM COR SERPL-MCNC: 8.8 MG/DL (ref 8.5–10.5)
CALCIUM SERPL-MCNC: 9 MG/DL (ref 8.5–10.5)
CASTS URNS QL MICRO: ABNORMAL /LPF (ref 0–2)
CHLORIDE SERPL-SCNC: 105 MMOL/L (ref 96–112)
CO2 SERPL-SCNC: 21 MMOL/L (ref 20–33)
COLOR UR: YELLOW
CREAT SERPL-MCNC: 1.28 MG/DL (ref 0.5–1.4)
EKG IMPRESSION: NORMAL
EOSINOPHIL # BLD AUTO: 0.02 K/UL (ref 0–0.51)
EOSINOPHIL NFR BLD: 0.2 % (ref 0–6.9)
EPITHELIAL CELLS 1715: ABNORMAL /HPF (ref 0–5)
ERYTHROCYTE [DISTWIDTH] IN BLOOD BY AUTOMATED COUNT: 40.1 FL (ref 35.9–50)
GFR SERPLBLD CREATININE-BSD FMLA CKD-EPI: 73 ML/MIN/1.73 M 2
GLOBULIN SER CALC-MCNC: 3.1 G/DL (ref 1.9–3.5)
GLUCOSE SERPL-MCNC: 139 MG/DL (ref 65–99)
GLUCOSE UR STRIP.AUTO-MCNC: NEGATIVE MG/DL
HCT VFR BLD AUTO: 46.5 % (ref 42–52)
HGB BLD-MCNC: 16 G/DL (ref 14–18)
IMM GRANULOCYTES # BLD AUTO: 0.04 K/UL (ref 0–0.11)
IMM GRANULOCYTES NFR BLD AUTO: 0.4 % (ref 0–0.9)
KETONES UR STRIP.AUTO-MCNC: ABNORMAL MG/DL
LEUKOCYTE ESTERASE UR QL STRIP.AUTO: NEGATIVE
LIPASE SERPL-CCNC: 20 U/L (ref 11–82)
LYMPHOCYTES # BLD AUTO: 1.88 K/UL (ref 1–4.8)
LYMPHOCYTES NFR BLD: 20.1 % (ref 22–41)
MCH RBC QN AUTO: 29.9 PG (ref 27–33)
MCHC RBC AUTO-ENTMCNC: 34.4 G/DL (ref 32.3–36.5)
MCV RBC AUTO: 86.9 FL (ref 81.4–97.8)
MICRO URNS: ABNORMAL
MONOCYTES # BLD AUTO: 0.85 K/UL (ref 0–0.85)
MONOCYTES NFR BLD AUTO: 9.1 % (ref 0–13.4)
NEUTROPHILS # BLD AUTO: 6.54 K/UL (ref 1.82–7.42)
NEUTROPHILS NFR BLD: 70 % (ref 44–72)
NITRITE UR QL STRIP.AUTO: NEGATIVE
NRBC # BLD AUTO: 0 K/UL
NRBC BLD-RTO: 0 /100 WBC (ref 0–0.2)
PH UR STRIP.AUTO: 5.5 [PH] (ref 5–8)
PLATELET # BLD AUTO: 209 K/UL (ref 164–446)
PMV BLD AUTO: 9.5 FL (ref 9–12.9)
POTASSIUM SERPL-SCNC: 3.8 MMOL/L (ref 3.6–5.5)
PROT SERPL-MCNC: 7.4 G/DL (ref 6–8.2)
PROT UR QL STRIP: 100 MG/DL
RBC # BLD AUTO: 5.35 M/UL (ref 4.7–6.1)
RBC # URNS HPF: ABNORMAL /HPF
RBC UR QL AUTO: ABNORMAL
SODIUM SERPL-SCNC: 140 MMOL/L (ref 135–145)
SP GR UR STRIP.AUTO: 1.02
TROPONIN T SERPL-MCNC: <6 NG/L (ref 6–19)
UROBILINOGEN UR STRIP.AUTO-MCNC: 1 EU/DL
WBC # BLD AUTO: 9.4 K/UL (ref 4.8–10.8)
WBC #/AREA URNS HPF: ABNORMAL /HPF

## 2025-05-02 PROCEDURE — 71045 X-RAY EXAM CHEST 1 VIEW: CPT

## 2025-05-02 PROCEDURE — 99285 EMERGENCY DEPT VISIT HI MDM: CPT

## 2025-05-02 PROCEDURE — 81001 URINALYSIS AUTO W/SCOPE: CPT

## 2025-05-02 PROCEDURE — 96375 TX/PRO/DX INJ NEW DRUG ADDON: CPT

## 2025-05-02 PROCEDURE — 84484 ASSAY OF TROPONIN QUANT: CPT

## 2025-05-02 PROCEDURE — 93005 ELECTROCARDIOGRAM TRACING: CPT | Mod: TC

## 2025-05-02 PROCEDURE — 85025 COMPLETE CBC W/AUTO DIFF WBC: CPT

## 2025-05-02 PROCEDURE — 93005 ELECTROCARDIOGRAM TRACING: CPT | Mod: TC | Performed by: EMERGENCY MEDICINE

## 2025-05-02 PROCEDURE — 700105 HCHG RX REV CODE 258: Performed by: EMERGENCY MEDICINE

## 2025-05-02 PROCEDURE — 83690 ASSAY OF LIPASE: CPT

## 2025-05-02 PROCEDURE — 700111 HCHG RX REV CODE 636 W/ 250 OVERRIDE (IP): Mod: JZ | Performed by: EMERGENCY MEDICINE

## 2025-05-02 PROCEDURE — 36415 COLL VENOUS BLD VENIPUNCTURE: CPT

## 2025-05-02 PROCEDURE — 74176 CT ABD & PELVIS W/O CONTRAST: CPT

## 2025-05-02 PROCEDURE — 80053 COMPREHEN METABOLIC PANEL: CPT

## 2025-05-02 PROCEDURE — 96374 THER/PROPH/DIAG INJ IV PUSH: CPT

## 2025-05-02 RX ORDER — IBUPROFEN 600 MG/1
600 TABLET, FILM COATED ORAL EVERY 6 HOURS PRN
Qty: 30 TABLET | Refills: 0 | Status: SHIPPED | OUTPATIENT
Start: 2025-05-02

## 2025-05-02 RX ORDER — ONDANSETRON 2 MG/ML
4 INJECTION INTRAMUSCULAR; INTRAVENOUS ONCE
Status: COMPLETED | OUTPATIENT
Start: 2025-05-02 | End: 2025-05-02

## 2025-05-02 RX ORDER — ONDANSETRON 4 MG/1
4 TABLET, ORALLY DISINTEGRATING ORAL EVERY 6 HOURS PRN
Qty: 15 TABLET | Refills: 0 | Status: SHIPPED | OUTPATIENT
Start: 2025-05-02

## 2025-05-02 RX ORDER — SODIUM CHLORIDE 9 MG/ML
1000 INJECTION, SOLUTION INTRAVENOUS ONCE
Status: COMPLETED | OUTPATIENT
Start: 2025-05-02 | End: 2025-05-02

## 2025-05-02 RX ORDER — HYDROMORPHONE HYDROCHLORIDE 1 MG/ML
0.5 INJECTION, SOLUTION INTRAMUSCULAR; INTRAVENOUS; SUBCUTANEOUS ONCE
Status: COMPLETED | OUTPATIENT
Start: 2025-05-02 | End: 2025-05-02

## 2025-05-02 RX ORDER — OXYCODONE HYDROCHLORIDE 5 MG/1
5 TABLET ORAL EVERY 4 HOURS PRN
Qty: 10 TABLET | Refills: 0 | Status: SHIPPED | OUTPATIENT
Start: 2025-05-02 | End: 2025-05-07

## 2025-05-02 RX ORDER — TAMSULOSIN HYDROCHLORIDE 0.4 MG/1
0.4 CAPSULE ORAL DAILY
Qty: 7 CAPSULE | Refills: 0 | Status: SHIPPED | OUTPATIENT
Start: 2025-05-02 | End: 2025-05-09

## 2025-05-02 RX ORDER — KETOROLAC TROMETHAMINE 15 MG/ML
15 INJECTION, SOLUTION INTRAMUSCULAR; INTRAVENOUS ONCE
Status: COMPLETED | OUTPATIENT
Start: 2025-05-02 | End: 2025-05-02

## 2025-05-02 RX ADMIN — HYDROMORPHONE HYDROCHLORIDE 0.5 MG: 1 INJECTION, SOLUTION INTRAMUSCULAR; INTRAVENOUS; SUBCUTANEOUS at 15:37

## 2025-05-02 RX ADMIN — ONDANSETRON 4 MG: 2 INJECTION INTRAMUSCULAR; INTRAVENOUS at 14:19

## 2025-05-02 RX ADMIN — KETOROLAC TROMETHAMINE 15 MG: 15 INJECTION, SOLUTION INTRAMUSCULAR; INTRAVENOUS at 14:19

## 2025-05-02 RX ADMIN — SODIUM CHLORIDE 1000 ML: 9 INJECTION, SOLUTION INTRAVENOUS at 15:36

## 2025-05-02 ASSESSMENT — FIBROSIS 4 INDEX: FIB4 SCORE: 0.74

## 2025-05-02 ASSESSMENT — HEART SCORE
AGE: <45
RISK FACTORS: 1-2 RISK FACTORS
TROPONIN: LESS THAN OR EQUAL TO NORMAL LIMIT
HEART SCORE: 1
ECG: NORMAL
HISTORY: SLIGHTLY SUSPICIOUS

## 2025-05-02 NOTE — DISCHARGE INSTRUCTIONS
You were seen in the Emergency Department for left-sided abdominal pain likely due to multiple kidney stones as well as chest pain    Labs, urinalysis, EKG and chest x-ray were completed without significant acute abnormalities.  CT scan showed multiple stone small stones within the left ureter.    Please use 1,000mg of tylenol or 600mg of ibuprofen every 6 hours as needed for pain.  Use stronger pain medication as needed for severe pain.    Please call for immediate follow-up with your established urologist on Monday    Return to the Emergency Department with uncontrolled pain or vomiting, fevers greater than 100.4, or other concerns.

## 2025-05-02 NOTE — ED TRIAGE NOTES
"Patient presents to the ER with the following complaints:    Chief Complaint   Patient presents with    Abdominal Pain     LLQ pain \"I felt like I was not going to make it in\".     Chest Pain     My chest hurts but not the primary reason I am here.        BP (!) 152/119   Pulse 61   Temp 36 °C (96.8 °F) (Temporal)   Resp (!) 22   Ht 1.854 m (6' 1\")   SpO2 96%   BMI 35.74 kg/m²       "

## 2025-05-02 NOTE — ED NOTES
"Pt walked into the ER with c/o chest pain with LLQ pain radiating to flank. Pt stated that he felt nauseas and \"not okay\".       "

## 2025-05-02 NOTE — ED PROVIDER NOTES
"ED Provider Note    CHIEF COMPLAINT  Chief Complaint   Patient presents with    Abdominal Pain     LLQ pain \"I felt like I was not going to make it in\".     Chest Pain     My chest hurts but not the primary reason I am here.      EXTERNAL RECORDS REVIEWED  Patient was seen 8/18/2024 at urgent care and diagnosed with a kidney stone at that time.  He followed up with urology since that time.  He was seen in 2021 for chest pain with a plan for a stress test however it does not appear that it was performed.    HPI/ROS  LIMITATION TO HISTORY   Select: : None  OUTSIDE HISTORIAN(S):  Cornelius    Erik Sandoval is a 39 y.o. male who presents to the Emergency Department with left flank pain and chest pain.  Patient states that around 1130 today he developed acute onset of left-sided flank pain that radiates down to the left lower quadrant.  He has had kidney stones before and states this could be due to this.  He denies any pain with urination or blood in the urine.  He has had some nausea without vomiting.  No fevers.  He states that he also developed some pressure-like pain in his chest since this started which is not something he is experienced before.    PAST MEDICAL HISTORY  Past Medical History:   Diagnosis Date    Kidney stones     Renal disorder     kidney stones    Snoring     no sleep study        SURGICAL HISTORY  Past Surgical History:   Procedure Laterality Date    CYSTOSCOPY STENT PLACEMENT N/A 9/6/2018    Procedure: CYSTOSCOPY STENT PLACEMENT - RIGID W/STENT REMOVAL AND POSS STENT REPLACEMENT;  Surgeon: Cr Dee M.D.;  Location: [facility];  Service: Urology    URETEROSCOPY Left 9/6/2018    Procedure: URETEROSCOPY - FLEXIBLE;  Surgeon: Cr Dee M.D.;  Location: [facility];  Service: Urology    LASERTRIPSY Left 9/6/2018    Procedure: LASERTRIPSY - LITHO ;  Surgeon: Cr Dee M.D.;  Location: [facility];  Service: Urology    CYSTOSCOPY STENT PLACEMENT Left " "8/19/2018    Procedure: CYSTOSCOPY STENT PLACEMENT;  Surgeon: Cr Dee M.D.;  Location: [facility];  Service: Urology    NASAL FRACTURE REDUCTION CLOSED          FAMILY HISTORY  Family History   Problem Relation Age of Onset    Stroke Father     Diabetes Maternal Grandmother     Diabetes Maternal Grandfather     Diabetes Paternal Grandmother     Diabetes Paternal Grandfather        SOCIAL HISTORY   reports that he has never smoked. He has never used smokeless tobacco. He reports that he does not drink alcohol and does not use drugs.    CURRENT MEDICATIONS  Previous Medications    No medications on file       ALLERGIES  Oxycodone    PHYSICAL EXAM  BP (!) 145/95   Pulse 65   Temp 36.1 °C (97 °F) (Temporal)   Resp 18   Ht 1.854 m (6' 1\")   Wt 113 kg (250 lb)   SpO2 97%      Constitutional: Nontoxic appearing. Alert in no apparent distress.  HENT: Normocephalic, Atraumatic. Bilateral external ears normal. Nose normal.  Moist mucous membranes.  Oropharynx clear.  Eyes: Pupils are equal and reactive. Conjunctiva normal.   Neck: Supple, full range of motion  Heart: Regular rate and rhythm.  No murmurs.    Lungs: No respiratory distress, normal work of breathing. Lungs clear to auscultation bilaterally.  Abdomen Soft, no distention.  Left flank tenderness, left upper and left lower quadrant tenderness.  Musculoskeletal: Atraumatic. No obvious deformities noted.  No lower extremity edema.  Skin: Warm, Dry.  No erythema, No rash.   Neurologic: Alert and oriented x3. Moving all extremities spontaneously without focal deficits.  Psychiatric: Affect normal, Mood normal, Appears appropriate and not intoxicated.      DIAGNOSTIC STUDIES / PROCEDURES    EKG  I have independently interpreted this EKG  Results for orders placed or performed during the hospital encounter of 05/02/25   EKG   Result Value Ref Range    Report       [facility] Emergency Dept.    Test Date:  2025-05-02  Pt " Name:    ZANE BROWER                  [facility]  MRN:        [id number]                      Room:  Gender:     Male                         Technician: [id number]  :        1985                   Requested By:ER TRIAGE PROTOCOL  Order #:    [id number]                    Reading MD: Melida Garza MD    Measurements  Intervals                                Axis  Rate:       72                           P:          26  ND:         154                          QRS:        18  QRSD:       91                           T:          25  QT:         375  QTc:        411    Interpretive Statements  Sinus rhythm  Normal intervals, no ectopy  No ST or T wave change  Compared to ECG 2018 20:00:57  Sinus tachycardia no longer present  Electronically Signed On 2025 14:12:26 PDT by Melida Garza MD         LABS  Labs Reviewed   URINALYSIS - Abnormal; Notable for the following components:       Result Value    Ketones Trace (*)     Protein 100 (*)     Occult Blood Moderate (*)     All other components within normal limits   CBC WITH DIFFERENTIAL - Abnormal; Notable for the following components:    Lymphocytes 20.10 (*)     All other components within normal limits   COMP METABOLIC PANEL - Abnormal; Notable for the following components:    Glucose 139 (*)     All other components within normal limits   URINE MICROSCOPIC (W/UA) - Abnormal; Notable for the following components:    WBC 6-10 (*)     RBC 21-50 (*)     Urine Casts 3-5 (*)     All other components within normal limits   LIPASE   TROPONIN   ESTIMATED GFR         RADIOLOGY  I have independently interpreted the diagnostic imaging associated with this visit and am waiting the final reading from the radiologist.   My preliminary interpretation is as follows: Left-sided hydronephrosis    Radiologist interpretation:  DX-CHEST-PORTABLE (1 VIEW)   Final Result      No acute cardiac or pulmonary abnormalities are identified.      CT-RENAL COLIC EVALUATION(A/P  W/O)   Final Result      1.  Multiple bilateral renal calculi.   2.  Multiple stones in the mid left ureter with moderate to severe left-sided hydroureteronephrosis.   3.  Hepatomegaly with fatty infiltration of the liver.            COURSE & MEDICAL DECISION MAKING  CHEST PAIN:   HEART Score for Major Cardiac Events  HEART Score     History: Slightly suspicious  ECG: Normal  Age: <45  Risk Factors: 1-2 risk factors  Troponin: Less than or equal to normal limit    Heart Score: 1    Total Score   0-3 Points = Low Score, risk of MACE 0.9-1.7%.  4-6 Points = Moderate Score, risk of MACE 12-16.6%  7-10 Points = High Score, risk of MACE 50-65%    ASSESSMENT, COURSE AND PLAN  Care Narrative: Patient with history of recurrent kidney stones who presents with acute onset of left-sided flank pain today in addition to chest pain.  He is mildly hypertensive on arrival with otherwise reassuring vital signs.  He does have focal tenderness over the left flank.  His EKG does not show evidence of ischemia or arrhythmia.  Troponin is negative making ACS unlikely.  Chest x-ray does not show evidence of pneumonia or pulmonary edema or pneumothorax.  Chest pain may just be referred from what seems to be recurrent kidney stone.  Labs otherwise do not show leukocytosis, renal dysfunction or electrolyte abnormality.  Urinalysis does show evidence of blood and few white blood cells however no findings concerning for infectious process.  Imaging was performed that does show evidence of multiple left-sided ureteral stones with associated hydronephrosis.  No other acute abnormality such as appendicitis or diverticulitis.  Patient was treated with pain medication with improvement of pain.  He is well-established with urology therefore we will plan to discharge home with pain control and instructions on outpatient follow-up with them as soon as possible.    Upon reassessment, patient is resting comfortably with stable vital signs.  No new  complaints at this time.  Discussed results with patient and/or family as well as importance of primary care, urology follow up.  Patient understands plan of care and strict return precautions for new or changing symptoms.       ADDITIONAL PROBLEM LIST  Problem #1: Acute ureterolithiasis -no signs of infectious process, will discharge home with instructions on scheduled ibuprofen, tamsulosin, pain and nausea medications as needed, patient will call his established urologist on Monday    Problem #2: Acute chest pain - may be related to above, workup reassuring, HEART score 1      DISPOSITION AND DISCUSSIONS    Escalation of care considered, and ultimately not performed:acute inpatient care management, however at this time, the patient is most appropriate for outpatient management    Decision tools and prescription drugs considered including, but not limited to: Antibiotics signs of bacterial infection .    DISPOSITION:  Patient will be discharged home in stable condition.    FOLLOW UP:  [facility] [place]  [address] [facility] [address]  [phone]    Schedule an appointment as soon as possible for a visit       Alireza Avery D.O.  [address] [facility] [id number] [phone]    Schedule an appointment as soon as possible for a visit       [facility], Emergency Dept  [address] [facility] [id number] [phone]    If symptoms worsen      OUTPATIENT MEDICATIONS:  New Prescriptions    IBUPROFEN (MOTRIN) 600 MG TAB    Take 1 Tablet by mouth every 6 hours as needed for Mild Pain or Moderate Pain.    ONDANSETRON (ZOFRAN ODT) 4 MG TABLET DISPERSIBLE    Take 1 Tablet by mouth every 6 hours as needed for Nausea/Vomiting.    OXYCODONE IMMEDIATE-RELEASE (ROXICODONE) 5 MG TAB    Take 1 Tablet by mouth every four hours as needed for Severe Pain for up to 5 days.    TAMSULOSIN (FLOMAX) 0.4 MG CAPSULE    Take 1 Capsule by mouth every day for 7 days.         FINAL DIAGNOSIS  1.  Ureterolithiasis    2. Chest pain, unspecified type      In prescribing controlled substances to this patient, I certify that I have obtained and reviewed the medical history of Erik Sandoval. I have also made a good crystal effort to obtain applicable records from other providers who have treated the patient and records did not demonstrate any increased risk of substance abuse that would prevent me from prescribing controlled substances.     I have conducted a physical exam and documented it. I have reviewed Mr. Sandoval’s prescription history as maintained by the [place] Prescription Monitoring Program.     I have assessed the patient’s risk for abuse, dependency, and addiction using the validated Opioid Risk Tool available at https://www.mdcalc.com/zlkodj-kfvc-xgrz-ort-narcotic-abuse.     Given the above, I believe the benefits of controlled substance therapy outweigh the risks. The reasons for prescribing controlled substances include non-narcotic, oral analgesic alternatives have been inadequate for pain control. Accordingly, I have discussed the risk and benefits, treatment plan, and alternative therapies with the patient.

## (undated) DEVICE — SPONGE GAUZESTER 4 X 4 4PLY - (128PK/CA)

## (undated) DEVICE — COVER FOOT UNIVERSAL DISP. - (25EA/CA)

## (undated) DEVICE — CANISTER SUCTION 3000ML MECHANICAL FILTER AUTO SHUTOFF MEDI-VAC NONSTERILE LF DISP  (40EA/CA)

## (undated) DEVICE — GLOVE BIOGEL PI INDICATOR SZ 7.5 SURGICAL PF LF -(50/BX 4BX/CA)

## (undated) DEVICE — SET EXTENSION WITH 2 PORTS (48EA/CA) ***PART #2C8610 IS A SUBSTITUTE*****

## (undated) DEVICE — SET IRRIGATION CYSTOSCOPY Y-TYPE L81 IN (20EA/CA)

## (undated) DEVICE — TUBING CLEARLINK DUO-VENT - C-FLO (48EA/CA)

## (undated) DEVICE — SYRINGE DISP. 12 CC LL - (100/BX)

## (undated) DEVICE — TUBE CONNECT SUCTION CLEAR 120 X 1/4" (50EA/CA)"

## (undated) DEVICE — HEAD HOLDER JUNIOR/ADULT

## (undated) DEVICE — ELECTRODE 850 FOAM ADHESIVE - HYDROGEL RADIOTRNSPRNT (50/PK)

## (undated) DEVICE — KIT ANESTHESIA W/CIRCUIT & 3/LT BAG W/FILTER (20EA/CA)

## (undated) DEVICE — JELLY, KY 2 0Z STERILE

## (undated) DEVICE — GLOVE BIOGEL SZ 7.5 SURGICAL PF LTX - (50PR/BX 4BX/CA)

## (undated) DEVICE — GOWN SURGICAL X-LARGE ULTRA - FILM-REINFORCED (20/CA)

## (undated) DEVICE — MASK ANESTHESIA ADULT  - (100/CA)

## (undated) DEVICE — CATHETER URET OPEN END 6FR (10EA/BX)

## (undated) DEVICE — SENSOR SPO2 NEO LNCS ADHESIVE (20/BX) SEE USER NOTES

## (undated) DEVICE — PROTECTOR ULNA NERVE - (36PR/CA)

## (undated) DEVICE — SET LEADWIRE 5 LEAD BEDSIDE DISPOSABLE ECG (1SET OF 5/EA)

## (undated) DEVICE — SODIUM CHL. IRRIGATION 0.9% 3000ML (4EA/CA 65CA/PF)

## (undated) DEVICE — GLOVE BIOGEL PI INDICATOR SZ 7.0 SURGICAL PF LF - (50/BX 4BX/CA)

## (undated) DEVICE — SLEEVE, VASO, THIGH, MED

## (undated) DEVICE — Device

## (undated) DEVICE — GOWN SURGEONS X-LARGE - DISP. (30/CA)

## (undated) DEVICE — SUCTION INSTRUMENT YANKAUER BULBOUS TIP W/O VENT (50EA/CA)

## (undated) DEVICE — KIT ROOM DECONTAMINATION

## (undated) DEVICE — NEPTUNE 4 PORT MANIFOLD - (20/PK)

## (undated) DEVICE — PACK CYSTOSCOPY III - (8/CA)

## (undated) DEVICE — MASK, LARYNGEAL AIRWAY #4

## (undated) DEVICE — WATER IRRIG. STER 3000 ML - (4/CA)

## (undated) DEVICE — CONTAINER SPECIMEN BAG OR - STERILE 4 OZ W/LID (100EA/CA)

## (undated) DEVICE — CONNECTOR HOSE NEPTUNE FOR CYSTO ROOM

## (undated) DEVICE — SYRINGE 20 ML LL (50EA/BX 4BX/CA)

## (undated) DEVICE — LACTATED RINGERS INJ 1000 ML - (14EA/CA 60CA/PF)

## (undated) DEVICE — GOWN WARMING STANDARD FLEX - (30/CA)

## (undated) DEVICE — WATER IRRIG. STER. 1500 ML - (9/CA)

## (undated) DEVICE — BAG URODRAIN WITH TUBING - (20/CA)

## (undated) DEVICE — GLOVE BIOGEL SZ 7 SURGICAL PF LTX - (50PR/BX 4BX/CA)